# Patient Record
Sex: FEMALE | Race: BLACK OR AFRICAN AMERICAN | NOT HISPANIC OR LATINO | Employment: FULL TIME | URBAN - METROPOLITAN AREA
[De-identification: names, ages, dates, MRNs, and addresses within clinical notes are randomized per-mention and may not be internally consistent; named-entity substitution may affect disease eponyms.]

---

## 2020-05-29 ENCOUNTER — OFFICE VISIT (OUTPATIENT)
Dept: OBSTETRICS AND GYNECOLOGY | Facility: CLINIC | Age: 55
End: 2020-05-29
Payer: COMMERCIAL

## 2020-05-29 VITALS
BODY MASS INDEX: 45.5 KG/M2 | DIASTOLIC BLOOD PRESSURE: 89 MMHG | HEIGHT: 61 IN | HEART RATE: 92 BPM | SYSTOLIC BLOOD PRESSURE: 145 MMHG | WEIGHT: 241 LBS

## 2020-05-29 DIAGNOSIS — Z01.419 WELL WOMAN EXAM: Primary | ICD-10-CM

## 2020-05-29 DIAGNOSIS — Z12.31 BREAST CANCER SCREENING BY MAMMOGRAM: ICD-10-CM

## 2020-05-29 PROCEDURE — 99396 PREV VISIT EST AGE 40-64: CPT | Mod: S$GLB,,, | Performed by: OBSTETRICS & GYNECOLOGY

## 2020-05-29 PROCEDURE — 99396 PR PREVENTIVE VISIT,EST,40-64: ICD-10-PCS | Mod: S$GLB,,, | Performed by: OBSTETRICS & GYNECOLOGY

## 2020-05-29 RX ORDER — INSULIN GLARGINE 300 U/ML
INJECTION, SOLUTION SUBCUTANEOUS
COMMUNITY
Start: 2020-05-22

## 2020-05-29 RX ORDER — FLASH GLUCOSE SENSOR
KIT MISCELLANEOUS
COMMUNITY
Start: 2020-05-17

## 2020-05-29 RX ORDER — INSULIN LISPRO 100 [IU]/ML
INJECTION, SOLUTION INTRAVENOUS; SUBCUTANEOUS
COMMUNITY
Start: 2020-04-03

## 2020-05-29 RX ORDER — VENLAFAXINE HYDROCHLORIDE 150 MG/1
CAPSULE, EXTENDED RELEASE ORAL
COMMUNITY
Start: 2020-05-11

## 2020-05-29 RX ORDER — PIOGLITAZONEHYDROCHLORIDE 30 MG/1
TABLET ORAL
COMMUNITY
Start: 2020-05-11

## 2020-05-29 RX ORDER — DULAGLUTIDE 1.5 MG/.5ML
INJECTION, SOLUTION SUBCUTANEOUS
COMMUNITY
Start: 2020-03-22 | End: 2021-11-23

## 2020-05-29 RX ORDER — ROSUVASTATIN CALCIUM 5 MG/1
TABLET, COATED ORAL
COMMUNITY
Start: 2020-03-14

## 2020-05-29 RX ORDER — LISINOPRIL AND HYDROCHLOROTHIAZIDE 12.5; 2 MG/1; MG/1
TABLET ORAL
COMMUNITY
Start: 2020-05-26

## 2020-05-29 RX ORDER — METFORMIN HYDROCHLORIDE 500 MG/1
TABLET, EXTENDED RELEASE ORAL
COMMUNITY
Start: 2020-04-16

## 2020-05-29 NOTE — PROGRESS NOTES
Subjective:       Patient ID: Sheron Sim is a 54 y.o. female.    Chief Complaint:  Well Woman (LAST PAP 5/6/19 NEGATIVE)      History of Present Illness  She is doing well.  No new health issues,  No abnormal bleeding, No GI or Gu concerns,  No dyspareunia.  No HRT       HPI      GYN & OB History  No LMP recorded. Patient is postmenopausal.     Date of Last Pap: No result found    OB History   No data available       Review of Systems  Review of Systems   Constitutional: Negative for activity change.   Eyes: Negative for visual disturbance.   Respiratory: Negative for shortness of breath.    Cardiovascular: Negative for chest pain.   Gastrointestinal: Negative for abdominal pain.   Genitourinary: Negative for vaginal bleeding.        No abnormal vaginal bleeding   Musculoskeletal: Negative for back pain.   Integumentary:  Negative for rash and breast mass.   Neurological: Negative for numbness.   Psychiatric/Behavioral:        No mood disturbance or changes    Breast: Negative for mass            Objective:    Physical Exam:   Constitutional: She is oriented to person, place, and time. She appears well-developed. She is cooperative.    HENT:   Head: Normocephalic.     Neck: Trachea normal. Neck supple. No thyromegaly present.    Cardiovascular: Regular rhythm and normal heart sounds.     Pulmonary/Chest: Effort normal and breath sounds normal. Right breast exhibits no mass, no nipple discharge and no skin change. Left breast exhibits no mass, no nipple discharge and no skin change.   Bilateral fibrocystic changes  During the exam self breast exams discussed         Abdominal: Soft. Normal appearance. There is no hepatosplenomegaly. There is no tenderness. There is no rebound and no guarding.     Genitourinary: Vagina normal and uterus normal. Pelvic exam was performed with patient supine. There is no lesion on the right labia. There is no lesion on the left labia. Uterus is not enlarged and not tender. Cervix is  normal. Right adnexum displays no mass and no tenderness. Left adnexum displays no mass and no tenderness. No rectocele, cystocele or unspecified prolapse of vaginal walls in the vagina. Labial bartholins normal.Cervix exhibits no discharge. Additional cervical findings: pap smear done  Genitourinary Comments: Due to body habitus  The uterus and ovaries cannot be assessed.   Her rectal was not done as she had a recent clolonoscopy              Lymphadenopathy:        Head (right side): No submental and no submandibular adenopathy present.        Head (left side): No submental and no submandibular adenopathy present.     She has no cervical adenopathy.        Right: No inguinal adenopathy present.        Left: No inguinal adenopathy present.    Neurological: She is alert and oriented to person, place, and time.    Skin: Skin is warm and intact.    Psychiatric: She has a normal mood and affect. Her speech is normal and behavior is normal. Thought content normal.          Assessment:        1. Well woman exam    2. Breast cancer screening by mammogram               Plan:           Pelvic u/s  Pap   Mammogram  Follow up one year or sooner if needed  Self breast exams  Consider health profile if labs not done with PCP  Bone density discussed   Pt is aware we call all results. If she does not hear from our office regarding her result within a week of having a study or procedure performed she is to call the office so that we can research the result for her as I do not know when she is scheduling her procedures.   Chaperone was present

## 2020-06-12 ENCOUNTER — PROCEDURE VISIT (OUTPATIENT)
Dept: OBSTETRICS AND GYNECOLOGY | Facility: CLINIC | Age: 55
End: 2020-06-12
Payer: COMMERCIAL

## 2020-06-12 DIAGNOSIS — Z01.419 WELL WOMAN EXAM: ICD-10-CM

## 2020-06-12 PROCEDURE — 76830 PR  ECHOGRAPHY,TRANSVAGINAL: ICD-10-PCS | Mod: S$GLB,,, | Performed by: OBSTETRICS & GYNECOLOGY

## 2020-06-12 PROCEDURE — 76830 TRANSVAGINAL US NON-OB: CPT | Mod: S$GLB,,, | Performed by: OBSTETRICS & GYNECOLOGY

## 2020-06-16 ENCOUNTER — TELEPHONE (OUTPATIENT)
Dept: OBSTETRICS AND GYNECOLOGY | Facility: CLINIC | Age: 55
End: 2020-06-16

## 2020-06-16 NOTE — TELEPHONE ENCOUNTER
----- Message from Alverto Goel III, MD sent at 6/15/2020  1:40 PM CDT -----  Please call normal u/s

## 2021-05-31 ENCOUNTER — OFFICE VISIT (OUTPATIENT)
Dept: OBSTETRICS AND GYNECOLOGY | Facility: CLINIC | Age: 56
End: 2021-05-31
Payer: COMMERCIAL

## 2021-05-31 VITALS
HEART RATE: 86 BPM | BODY MASS INDEX: 47.8 KG/M2 | WEIGHT: 253 LBS | DIASTOLIC BLOOD PRESSURE: 84 MMHG | SYSTOLIC BLOOD PRESSURE: 135 MMHG

## 2021-05-31 DIAGNOSIS — Z12.31 BREAST CANCER SCREENING BY MAMMOGRAM: ICD-10-CM

## 2021-05-31 DIAGNOSIS — Z01.419 WELL WOMAN EXAM WITH ROUTINE GYNECOLOGICAL EXAM: Primary | ICD-10-CM

## 2021-05-31 PROCEDURE — 99396 PREV VISIT EST AGE 40-64: CPT | Mod: 25,S$GLB,, | Performed by: OBSTETRICS & GYNECOLOGY

## 2021-05-31 PROCEDURE — 99396 PR PREVENTIVE VISIT,EST,40-64: ICD-10-PCS | Mod: 25,S$GLB,, | Performed by: OBSTETRICS & GYNECOLOGY

## 2021-05-31 PROCEDURE — 82274 PR  BLOOD,OCCULT,FECAL HGB,FECES,1-3 SIMULT: ICD-10-PCS | Mod: QW,S$GLB,, | Performed by: OBSTETRICS & GYNECOLOGY

## 2021-05-31 PROCEDURE — 3008F PR BODY MASS INDEX (BMI) DOCUMENTED: ICD-10-PCS | Mod: CPTII,S$GLB,, | Performed by: OBSTETRICS & GYNECOLOGY

## 2021-05-31 PROCEDURE — 82274 ASSAY TEST FOR BLOOD FECAL: CPT | Mod: QW,S$GLB,, | Performed by: OBSTETRICS & GYNECOLOGY

## 2021-05-31 PROCEDURE — 3008F BODY MASS INDEX DOCD: CPT | Mod: CPTII,S$GLB,, | Performed by: OBSTETRICS & GYNECOLOGY

## 2021-11-23 ENCOUNTER — OFFICE VISIT (OUTPATIENT)
Dept: HEMATOLOGY/ONCOLOGY | Facility: CLINIC | Age: 56
End: 2021-11-23
Payer: COMMERCIAL

## 2021-11-23 VITALS
HEIGHT: 62 IN | HEART RATE: 79 BPM | SYSTOLIC BLOOD PRESSURE: 147 MMHG | BODY MASS INDEX: 46.19 KG/M2 | DIASTOLIC BLOOD PRESSURE: 81 MMHG | OXYGEN SATURATION: 98 % | TEMPERATURE: 97 F | WEIGHT: 251 LBS | RESPIRATION RATE: 18 BRPM

## 2021-11-23 DIAGNOSIS — C83.31 DIFFUSE LARGE B-CELL LYMPHOMA OF LYMPH NODES OF NECK: Primary | ICD-10-CM

## 2021-11-23 LAB
ALBUMIN SERPL BCP-MCNC: 3.9 G/DL (ref 3.4–5)
ALBUMIN/GLOBULIN RATIO: 1.03 RATIO (ref 1.1–1.8)
ALP SERPL-CCNC: 114 U/L (ref 46–116)
ALT SERPL W P-5'-P-CCNC: 25 U/L (ref 12–78)
ANION GAP SERPL CALC-SCNC: 8 MMOL/L (ref 3–11)
AST SERPL-CCNC: 17 U/L (ref 15–37)
BASOPHILS NFR BLD: 0.7 % (ref 0–3)
BILIRUB SERPL-MCNC: 0.4 MG/DL (ref 0–1)
BUN SERPL-MCNC: 13 MG/DL (ref 7–18)
BUN/CREAT SERPL: 18.57 RATIO (ref 7–18)
CALCIUM SERPL-MCNC: 9.7 MG/DL (ref 8.8–10.5)
CHLORIDE SERPL-SCNC: 101 MMOL/L (ref 100–108)
CO2 SERPL-SCNC: 32 MMOL/L (ref 21–32)
CREAT SERPL-MCNC: 0.7 MG/DL (ref 0.55–1.02)
EOSINOPHIL NFR BLD: 3.5 % (ref 1–3)
ERYTHROCYTE [DISTWIDTH] IN BLOOD BY AUTOMATED COUNT: 13.6 % (ref 12.5–18)
GFR ESTIMATION: > 60
GLOBULIN: 3.8 G/DL (ref 2.3–3.5)
GLUCOSE SERPL-MCNC: 108 MG/DL (ref 70–110)
HCT VFR BLD AUTO: 39.5 % (ref 37–47)
HGB BLD-MCNC: 12.3 G/DL (ref 12–16)
LDH SERPL L TO P-CCNC: 184 U/L (ref 82–234)
LYMPHOCYTES NFR BLD: 23.2 % (ref 25–40)
MCH RBC QN AUTO: 28.4 PG (ref 27–31.2)
MCHC RBC AUTO-ENTMCNC: 31.1 G/DL (ref 31.8–35.4)
MCV RBC AUTO: 91.2 FL (ref 80–97)
MONOCYTES NFR BLD: 5.9 % (ref 1–15)
NEUTROPHILS # BLD AUTO: 6 10*3/UL (ref 1.8–7.7)
NEUTROPHILS NFR BLD: 66.1 % (ref 37–80)
NUCLEATED RED BLOOD CELLS: 0 %
PLATELETS: 326 10*3/UL (ref 142–424)
POTASSIUM SERPL-SCNC: 4.5 MMOL/L (ref 3.6–5.2)
PROT SERPL-MCNC: 7.7 G/DL (ref 6.4–8.2)
RBC # BLD AUTO: 4.33 10*6/UL (ref 4.2–5.4)
SODIUM BLD-SCNC: 141 MMOL/L (ref 135–145)
URATE SERPL-MCNC: 6 MG/DL (ref 2.6–6)
WBC # BLD: 9.1 10*3/UL (ref 4.6–10.2)

## 2021-11-23 PROCEDURE — 4010F ACE/ARB THERAPY RXD/TAKEN: CPT | Mod: CPTII,S$GLB,, | Performed by: NURSE PRACTITIONER

## 2021-11-23 PROCEDURE — 99205 PR OFFICE/OUTPT VISIT, NEW, LEVL V, 60-74 MIN: ICD-10-PCS | Mod: S$GLB,,, | Performed by: NURSE PRACTITIONER

## 2021-11-23 PROCEDURE — 4010F PR ACE/ARB THEARPY RXD/TAKEN: ICD-10-PCS | Mod: CPTII,S$GLB,, | Performed by: NURSE PRACTITIONER

## 2021-11-23 PROCEDURE — 99205 OFFICE O/P NEW HI 60 MIN: CPT | Mod: S$GLB,,, | Performed by: NURSE PRACTITIONER

## 2021-11-23 RX ORDER — PANTOPRAZOLE SODIUM 40 MG/1
40 TABLET, DELAYED RELEASE ORAL DAILY
COMMUNITY
Start: 2021-11-09

## 2021-11-23 RX ORDER — PEN NEEDLE, DIABETIC 31 GX5/16"
NEEDLE, DISPOSABLE MISCELLANEOUS
COMMUNITY
Start: 2021-11-19

## 2021-11-23 RX ORDER — SEMAGLUTIDE 1.34 MG/ML
INJECTION, SOLUTION SUBCUTANEOUS
COMMUNITY
Start: 2021-11-19 | End: 2023-12-11

## 2021-11-24 ENCOUNTER — TELEPHONE (OUTPATIENT)
Dept: HEMATOLOGY/ONCOLOGY | Facility: CLINIC | Age: 56
End: 2021-11-24
Payer: COMMERCIAL

## 2021-11-24 DIAGNOSIS — C83.31 DIFFUSE LARGE B-CELL LYMPHOMA OF LYMPH NODES OF NECK: Primary | ICD-10-CM

## 2021-11-29 LAB
ANISOCYTOSIS: ABNORMAL
BANDS: 0 % (ref 2–6)
CELLS COUNTED: 100
EOSINOPHIL NFR BLD: 2 % (ref 0–6)
ERYTHROCYTE [DISTWIDTH] IN BLOOD BY AUTOMATED COUNT: 13.6 % (ref 0–15.5)
GRANULOCYTES: 5.6 10*3/UL (ref 1.4–7)
HCT VFR BLD AUTO: 36.2 % (ref 37–47)
HGB BLD-MCNC: 11.4 G/DL (ref 12–16)
LYMPHOCYTES NFR BLD: 19 % (ref 20–45)
MCH RBC QN AUTO: 27.9 PG (ref 27–32)
MCHC RBC AUTO-ENTMCNC: 31.5 % (ref 32–36)
MCV RBC AUTO: 88.7 FL (ref 80–99)
MONOCYTES NFR BLD: 5 % (ref 2–10)
MYELOCYTES: 1 % (ref 0–1)
NEUTROPHILS NFR BLD: 73 % (ref 50–80)
NUCLEATED RBC-MANUAL: 0 /100 WBC
PLATELET MORPHOLOGY: NORMAL
PLATELETS: 283 10*3/UL (ref 130–400)
PMV BLD AUTO: 10 FL (ref 9.2–12.2)
POLYCHROMASIA BLD QL SMEAR: ABNORMAL
RBC # BLD AUTO: 4.08 10*6/UL (ref 4.2–5.4)
SMALL PLATELETS BLD QL SMEAR: NORMAL
WBC # BLD: 7.7 10*3/UL (ref 4.5–10)

## 2021-12-02 ENCOUNTER — TELEPHONE (OUTPATIENT)
Dept: HEMATOLOGY/ONCOLOGY | Facility: CLINIC | Age: 56
End: 2021-12-02
Payer: COMMERCIAL

## 2021-12-08 ENCOUNTER — TELEPHONE (OUTPATIENT)
Dept: HEMATOLOGY/ONCOLOGY | Facility: CLINIC | Age: 56
End: 2021-12-08
Payer: COMMERCIAL

## 2021-12-08 LAB — SPECIMEN TO PATHOLOGY: NORMAL

## 2021-12-14 ENCOUNTER — OFFICE VISIT (OUTPATIENT)
Dept: HEMATOLOGY/ONCOLOGY | Facility: CLINIC | Age: 56
End: 2021-12-14
Payer: COMMERCIAL

## 2021-12-14 VITALS
BODY MASS INDEX: 46.34 KG/M2 | DIASTOLIC BLOOD PRESSURE: 83 MMHG | HEART RATE: 96 BPM | HEIGHT: 62 IN | WEIGHT: 251.81 LBS | RESPIRATION RATE: 18 BRPM | TEMPERATURE: 98 F | OXYGEN SATURATION: 98 % | SYSTOLIC BLOOD PRESSURE: 141 MMHG

## 2021-12-14 DIAGNOSIS — C83.31 DIFFUSE LARGE B-CELL LYMPHOMA OF LYMPH NODES OF NECK: Primary | ICD-10-CM

## 2021-12-14 PROCEDURE — 4010F ACE/ARB THERAPY RXD/TAKEN: CPT | Mod: CPTII,S$GLB,, | Performed by: INTERNAL MEDICINE

## 2021-12-14 PROCEDURE — 99215 OFFICE O/P EST HI 40 MIN: CPT | Mod: S$GLB,,, | Performed by: INTERNAL MEDICINE

## 2021-12-14 PROCEDURE — 4010F PR ACE/ARB THEARPY RXD/TAKEN: ICD-10-PCS | Mod: CPTII,S$GLB,, | Performed by: INTERNAL MEDICINE

## 2021-12-14 PROCEDURE — 99215 PR OFFICE/OUTPT VISIT, EST, LEVL V, 40-54 MIN: ICD-10-PCS | Mod: S$GLB,,, | Performed by: INTERNAL MEDICINE

## 2021-12-17 ENCOUNTER — OFFICE VISIT (OUTPATIENT)
Dept: HEMATOLOGY/ONCOLOGY | Facility: CLINIC | Age: 56
End: 2021-12-17
Payer: COMMERCIAL

## 2021-12-17 VITALS
RESPIRATION RATE: 16 BRPM | SYSTOLIC BLOOD PRESSURE: 128 MMHG | OXYGEN SATURATION: 96 % | HEIGHT: 62 IN | HEART RATE: 96 BPM | WEIGHT: 251.19 LBS | BODY MASS INDEX: 46.22 KG/M2 | TEMPERATURE: 98 F | DIASTOLIC BLOOD PRESSURE: 81 MMHG

## 2021-12-17 DIAGNOSIS — R59.1 LYMPHADENOPATHY: Primary | ICD-10-CM

## 2021-12-17 DIAGNOSIS — I88.9 LYMPHADENITIS: ICD-10-CM

## 2021-12-17 PROBLEM — C83.31 DIFFUSE LARGE B-CELL LYMPHOMA OF LYMPH NODES OF NECK: Status: RESOLVED | Noted: 2021-12-14 | Resolved: 2021-12-17

## 2021-12-17 PROCEDURE — 4010F PR ACE/ARB THEARPY RXD/TAKEN: ICD-10-PCS | Mod: CPTII,S$GLB,, | Performed by: INTERNAL MEDICINE

## 2021-12-17 PROCEDURE — 99213 OFFICE O/P EST LOW 20 MIN: CPT | Mod: S$GLB,,, | Performed by: INTERNAL MEDICINE

## 2021-12-17 PROCEDURE — 4010F ACE/ARB THERAPY RXD/TAKEN: CPT | Mod: CPTII,S$GLB,, | Performed by: INTERNAL MEDICINE

## 2021-12-17 PROCEDURE — 99213 PR OFFICE/OUTPT VISIT, EST, LEVL III, 20-29 MIN: ICD-10-PCS | Mod: S$GLB,,, | Performed by: INTERNAL MEDICINE

## 2022-05-25 ENCOUNTER — PATIENT MESSAGE (OUTPATIENT)
Dept: OBSTETRICS AND GYNECOLOGY | Facility: CLINIC | Age: 57
End: 2022-05-25
Payer: COMMERCIAL

## 2022-06-06 ENCOUNTER — PATIENT MESSAGE (OUTPATIENT)
Dept: OBSTETRICS AND GYNECOLOGY | Facility: CLINIC | Age: 57
End: 2022-06-06
Payer: COMMERCIAL

## 2022-06-15 DIAGNOSIS — R59.1 LYMPHADENOPATHY: Primary | ICD-10-CM

## 2022-06-16 ENCOUNTER — CLINICAL SUPPORT (OUTPATIENT)
Dept: HEMATOLOGY/ONCOLOGY | Facility: CLINIC | Age: 57
End: 2022-06-16
Payer: COMMERCIAL

## 2022-06-16 DIAGNOSIS — R59.1 LYMPHADENOPATHY: ICD-10-CM

## 2022-06-16 LAB
BASOPHILS NFR BLD: 0.7 % (ref 0–3)
EOSINOPHIL NFR BLD: 4 % (ref 1–3)
ERYTHROCYTE [DISTWIDTH] IN BLOOD BY AUTOMATED COUNT: 13.4 % (ref 12.5–18)
HCT VFR BLD AUTO: 35.9 % (ref 37–47)
HGB BLD-MCNC: 11.4 G/DL (ref 12–16)
LYMPHOCYTES NFR BLD: 20.8 % (ref 25–40)
MCH RBC QN AUTO: 28.6 PG (ref 27–31.2)
MCHC RBC AUTO-ENTMCNC: 31.8 G/DL (ref 31.8–35.4)
MCV RBC AUTO: 90.2 FL (ref 80–97)
MONOCYTES NFR BLD: 6.3 % (ref 1–15)
NEUTROPHILS # BLD AUTO: 7.25 10*3/UL (ref 1.8–7.7)
NEUTROPHILS NFR BLD: 67.8 % (ref 37–80)
NUCLEATED RED BLOOD CELLS: 0 %
PLATELETS: 300 10*3/UL (ref 142–424)
RBC # BLD AUTO: 3.98 10*6/UL (ref 4.2–5.4)
WBC # BLD: 10.7 10*3/UL (ref 4.6–10.2)

## 2022-07-15 ENCOUNTER — OFFICE VISIT (OUTPATIENT)
Dept: OBSTETRICS AND GYNECOLOGY | Facility: CLINIC | Age: 57
End: 2022-07-15
Payer: COMMERCIAL

## 2022-07-15 VITALS
SYSTOLIC BLOOD PRESSURE: 146 MMHG | BODY MASS INDEX: 44.02 KG/M2 | WEIGHT: 233 LBS | HEART RATE: 95 BPM | DIASTOLIC BLOOD PRESSURE: 79 MMHG

## 2022-07-15 DIAGNOSIS — Z01.419 WELL WOMAN EXAM WITH ROUTINE GYNECOLOGICAL EXAM: Primary | ICD-10-CM

## 2022-07-15 DIAGNOSIS — Z12.31 BREAST CANCER SCREENING BY MAMMOGRAM: ICD-10-CM

## 2022-07-15 PROCEDURE — 3008F PR BODY MASS INDEX (BMI) DOCUMENTED: ICD-10-PCS | Mod: CPTII,S$GLB,, | Performed by: OBSTETRICS & GYNECOLOGY

## 2022-07-15 PROCEDURE — 99396 PREV VISIT EST AGE 40-64: CPT | Mod: 25,S$GLB,, | Performed by: OBSTETRICS & GYNECOLOGY

## 2022-07-15 PROCEDURE — 3008F BODY MASS INDEX DOCD: CPT | Mod: CPTII,S$GLB,, | Performed by: OBSTETRICS & GYNECOLOGY

## 2022-07-15 PROCEDURE — 1159F MED LIST DOCD IN RCRD: CPT | Mod: CPTII,S$GLB,, | Performed by: OBSTETRICS & GYNECOLOGY

## 2022-07-15 PROCEDURE — 1159F PR MEDICATION LIST DOCUMENTED IN MEDICAL RECORD: ICD-10-PCS | Mod: CPTII,S$GLB,, | Performed by: OBSTETRICS & GYNECOLOGY

## 2022-07-15 PROCEDURE — 3078F PR MOST RECENT DIASTOLIC BLOOD PRESSURE < 80 MM HG: ICD-10-PCS | Mod: CPTII,S$GLB,, | Performed by: OBSTETRICS & GYNECOLOGY

## 2022-07-15 PROCEDURE — 82274 PR  BLOOD,OCCULT,FECAL HGB,FECES,1-3 SIMULT: ICD-10-PCS | Mod: QW,S$GLB,, | Performed by: OBSTETRICS & GYNECOLOGY

## 2022-07-15 PROCEDURE — 4010F ACE/ARB THERAPY RXD/TAKEN: CPT | Mod: CPTII,S$GLB,, | Performed by: OBSTETRICS & GYNECOLOGY

## 2022-07-15 PROCEDURE — 3078F DIAST BP <80 MM HG: CPT | Mod: CPTII,S$GLB,, | Performed by: OBSTETRICS & GYNECOLOGY

## 2022-07-15 PROCEDURE — 99396 PR PREVENTIVE VISIT,EST,40-64: ICD-10-PCS | Mod: 25,S$GLB,, | Performed by: OBSTETRICS & GYNECOLOGY

## 2022-07-15 PROCEDURE — 3077F PR MOST RECENT SYSTOLIC BLOOD PRESSURE >= 140 MM HG: ICD-10-PCS | Mod: CPTII,S$GLB,, | Performed by: OBSTETRICS & GYNECOLOGY

## 2022-07-15 PROCEDURE — 82274 ASSAY TEST FOR BLOOD FECAL: CPT | Mod: QW,S$GLB,, | Performed by: OBSTETRICS & GYNECOLOGY

## 2022-07-15 PROCEDURE — 4010F PR ACE/ARB THEARPY RXD/TAKEN: ICD-10-PCS | Mod: CPTII,S$GLB,, | Performed by: OBSTETRICS & GYNECOLOGY

## 2022-07-15 PROCEDURE — 3077F SYST BP >= 140 MM HG: CPT | Mod: CPTII,S$GLB,, | Performed by: OBSTETRICS & GYNECOLOGY

## 2022-07-15 NOTE — PROGRESS NOTES
Subjective:       Patient ID: Sheron Sim is a 56 y.o. female.    Chief Complaint:  Well Woman (PT STATES SHE EXPERIENCED A LITTLE SPOTTING A COUPLE WEEKS. SHE STATES IT WAS ONLY WHEN SHE WIPED, NOT ENOUGH TO NEED A PAD. )      History of Present Illness  She is doing well.  No new health issues,  No abnormal bleeding, No GI or Gu concerns,  No dyspareunia.   She is doing some follow up for a lymph node but al has been negative.      She did have some vag spotting recently.  Not sexually active.  No HRT       HPI      GYN & OB History  No LMP recorded. Patient is postmenopausal.     Date of Last Pap: No result found    OB History    Para Term  AB Living   0 0 0 0 0 0   SAB IAB Ectopic Multiple Live Births   0 0 0 0 0       Review of Systems  Review of Systems   Constitutional: Negative for activity change.   Eyes: Negative for visual disturbance.   Respiratory: Negative for shortness of breath.    Cardiovascular: Negative for chest pain.   Gastrointestinal: Negative for abdominal pain.   Genitourinary: Positive for vaginal bleeding.        No abnormal vaginal bleeding   Musculoskeletal: Negative for back pain.   Integumentary:  Negative for rash and breast mass.   Neurological: Negative for numbness.   Psychiatric/Behavioral:        No mood disturbance or changes    Breast: Negative for mass            Objective:    Physical Exam:   Constitutional: She is oriented to person, place, and time. She appears well-developed. She is cooperative.    HENT:   Head: Normocephalic.     Neck: Trachea normal. No thyromegaly present.    Cardiovascular: Normal rate, regular rhythm and normal heart sounds.     Pulmonary/Chest: Effort normal and breath sounds normal. Right breast exhibits no mass, no nipple discharge and no skin change. Left breast exhibits no mass, no nipple discharge and no skin change.   Bilateral fibrocystic changes  During the exam self breast exams discussed         Abdominal: Soft. There is no  abdominal tenderness. There is no rebound and no guarding.     Genitourinary:    Vagina, uterus and rectum normal.   Rectum:      Guaiac result negative.   Guaiac negative stool.    Pelvic exam was performed with patient supine.   Labial bartholins normal.There is no lesion on the right labia. There is no lesion on the left labia. Cervix is normal. Right adnexum displays no mass and no tenderness. Left adnexum displays no mass and no tenderness. Cervix exhibits no discharge. Also,  pap smear completed  Uterus is not enlarged and not tender.              Lymphadenopathy:        Head (right side): No submental and no submandibular adenopathy present.        Head (left side): No submental and no submandibular adenopathy present.     She has no cervical adenopathy.    Neurological: She is alert and oriented to person, place, and time.    Skin: Skin is warm.    Psychiatric: She has a normal mood and affect. Her speech is normal and behavior is normal. Thought content normal.          Assessment:        1. Well woman exam with routine gynecological exam    2. Breast cancer screening by mammogram               Plan:             Pap   Mammogram  Follow up one year or sooner if needed  Self breast exams  Consider health profile if labs not done with PCP  Recommend colonoscopy as indicated  Bone density discussed   Pt is aware we call all results. If she does not hear from our office regarding her result within a week of having a study or procedure performed she is to call the office so that we can research the result for her as I do not know when she is scheduling her procedures.   Chaperone was present

## 2022-12-14 DIAGNOSIS — C83.31 DIFFUSE LARGE B-CELL LYMPHOMA OF LYMPH NODES OF NECK: Primary | ICD-10-CM

## 2022-12-15 ENCOUNTER — OFFICE VISIT (OUTPATIENT)
Dept: HEMATOLOGY/ONCOLOGY | Facility: CLINIC | Age: 57
End: 2022-12-15
Payer: COMMERCIAL

## 2022-12-15 VITALS
OXYGEN SATURATION: 97 % | BODY MASS INDEX: 42.86 KG/M2 | WEIGHT: 227 LBS | RESPIRATION RATE: 18 BRPM | DIASTOLIC BLOOD PRESSURE: 81 MMHG | HEART RATE: 78 BPM | HEIGHT: 61 IN | SYSTOLIC BLOOD PRESSURE: 132 MMHG

## 2022-12-15 DIAGNOSIS — R59.1 LYMPHADENOPATHY: Primary | ICD-10-CM

## 2022-12-15 LAB
BASOPHILS NFR BLD: 0.7 % (ref 0–3)
EOSINOPHIL NFR BLD: 3.7 % (ref 1–3)
ERYTHROCYTE [DISTWIDTH] IN BLOOD BY AUTOMATED COUNT: 13.3 % (ref 12.5–18)
HCT VFR BLD AUTO: 36.3 % (ref 37–47)
HGB BLD-MCNC: 11.8 G/DL (ref 12–16)
LYMPHOCYTES NFR BLD: 21.2 % (ref 25–40)
MCH RBC QN AUTO: 29.5 PG (ref 27–31.2)
MCHC RBC AUTO-ENTMCNC: 32.5 G/DL (ref 31.8–35.4)
MCV RBC AUTO: 90.8 FL (ref 80–97)
MONOCYTES NFR BLD: 6.2 % (ref 1–15)
NEUTROPHILS # BLD AUTO: 6.9 10*3/UL (ref 1.8–7.7)
NEUTROPHILS NFR BLD: 67.8 % (ref 37–80)
NUCLEATED RED BLOOD CELLS: 0 %
PLATELETS: 309 10*3/UL (ref 142–424)
RBC # BLD AUTO: 4 10*6/UL (ref 4.2–5.4)
WBC # BLD: 10.2 10*3/UL (ref 4.6–10.2)

## 2022-12-15 PROCEDURE — 1159F PR MEDICATION LIST DOCUMENTED IN MEDICAL RECORD: ICD-10-PCS | Mod: CPTII,S$GLB,, | Performed by: NURSE PRACTITIONER

## 2022-12-15 PROCEDURE — 3075F SYST BP GE 130 - 139MM HG: CPT | Mod: CPTII,S$GLB,, | Performed by: NURSE PRACTITIONER

## 2022-12-15 PROCEDURE — 1159F MED LIST DOCD IN RCRD: CPT | Mod: CPTII,S$GLB,, | Performed by: NURSE PRACTITIONER

## 2022-12-15 PROCEDURE — 3008F BODY MASS INDEX DOCD: CPT | Mod: CPTII,S$GLB,, | Performed by: NURSE PRACTITIONER

## 2022-12-15 PROCEDURE — 4010F ACE/ARB THERAPY RXD/TAKEN: CPT | Mod: CPTII,S$GLB,, | Performed by: NURSE PRACTITIONER

## 2022-12-15 PROCEDURE — 4010F PR ACE/ARB THEARPY RXD/TAKEN: ICD-10-PCS | Mod: CPTII,S$GLB,, | Performed by: NURSE PRACTITIONER

## 2022-12-15 PROCEDURE — 3079F PR MOST RECENT DIASTOLIC BLOOD PRESSURE 80-89 MM HG: ICD-10-PCS | Mod: CPTII,S$GLB,, | Performed by: NURSE PRACTITIONER

## 2022-12-15 PROCEDURE — 99214 PR OFFICE/OUTPT VISIT, EST, LEVL IV, 30-39 MIN: ICD-10-PCS | Mod: S$GLB,,, | Performed by: NURSE PRACTITIONER

## 2022-12-15 PROCEDURE — 99214 OFFICE O/P EST MOD 30 MIN: CPT | Mod: S$GLB,,, | Performed by: NURSE PRACTITIONER

## 2022-12-15 PROCEDURE — 1160F RVW MEDS BY RX/DR IN RCRD: CPT | Mod: CPTII,S$GLB,, | Performed by: NURSE PRACTITIONER

## 2022-12-15 PROCEDURE — 3075F PR MOST RECENT SYSTOLIC BLOOD PRESS GE 130-139MM HG: ICD-10-PCS | Mod: CPTII,S$GLB,, | Performed by: NURSE PRACTITIONER

## 2022-12-15 PROCEDURE — 1160F PR REVIEW ALL MEDS BY PRESCRIBER/CLIN PHARMACIST DOCUMENTED: ICD-10-PCS | Mod: CPTII,S$GLB,, | Performed by: NURSE PRACTITIONER

## 2022-12-15 PROCEDURE — 3008F PR BODY MASS INDEX (BMI) DOCUMENTED: ICD-10-PCS | Mod: CPTII,S$GLB,, | Performed by: NURSE PRACTITIONER

## 2022-12-15 PROCEDURE — 3079F DIAST BP 80-89 MM HG: CPT | Mod: CPTII,S$GLB,, | Performed by: NURSE PRACTITIONER

## 2022-12-15 NOTE — PROGRESS NOTES
MEDICAL ONCOLOGY FOLLOW UP CONSULTATION NOTE    Patient ID: Sheron Sim is a 57 y.o. female.    Chief Complaint: Suspicion for Diffuse Large B cell lymphoma    HPI: is 56-year-old black female referred to our clinic by Dr. Núñez, ENT. Patient states approximately 3 months ago she noticed a mass underneath her chin.  She reported this to her PCP Dr. Gracia who referred her for evaluation with ENT.  Subsequently patient underwent CT neck,  Unavailable for review at this time, and FNA.  Patient states FNA was inconclusive so Dr. Núñez proceeded with excisional biopsy of lymph node on 11/12/21.  Pathology is listed below which is suspicious for B cell lymphoma.  Patient denies any fevers night sweats or weight loss and no palpable lymph nodes noted today on physical exam.     Pathology: 11/16/21    Lymph node, neck, excisional biopsy:  Reactive lymph node with follicular and interfollicular hyperplasia and focal findings suggestive of progressive transformation of germinal center    Immunostain results:  BCL2:  Negative in germinal center region of follicles.  BCL 6:  Positive in B cells in germinal center origin of follicles.  CD10:  Positive in germinal center region of follicles    CD3:  Highlights background largest interfollicular T cell infiltrate.  Ki 67:  Proliferative index is 80% and germinal center regions, compatible with reactive.    BONE MARROW, SITE NOT SPECIFIED, CORE BIOPSY, CLOT SECTION, ASPIRATE, AND   PERIPHERAL SMEAR: 12/7/21  -- NEGATIVE FOR INVOLVEMENT BY LYMPHOMA.   -- NORMOCELLULAR BONE MARROW (APPROXIMATELY 50% CELLULARITY) WITH:   -- NO ATYPICAL INFILTRATE OF LYMPHOID CELLS, AS EVALUATED BY   IMMUNOHISTOCHEMISTRY.   -- GRANULOCYTIC SERIES PRESENT AND MATURING WITHOUT ATYPIA.   -- ERYTHROID SERIES PRESENT AND MATURING WITHOUT ATYPIA.   -- M:E RATIO APPROXIMATELY 2:1.   -- MEGAKARYOCYTES NORMAL IN NUMBER, SCATTERED, WITHOUT ATYPIA.   -- NO GRANULOMATOUS INFLAMMATION OR METASTATIC  CARCINOMA SEEN.   -- IRON STAINING PRESENT.   -- FLOW CYTOMETRY REVEALS NO UNIQUE CELL POPULATIONS, SEE REPORT BELOW.   - FISH STUDIES ARE NORMAL, SEE REPORT BELOW.   - SEE COMMENT.   Comment:   Clinically, the patient has a high suspicion for underlying a B-cell   lymphoma process involving the neck.  The bone marrow evaluation is an   essentially normal evaluation without evidence of lymphoma.  CCND1,   CCND1/IGH, BCL2, and BCL6 studies ( a requested by ordering clinician) are   negative for rearrangement by FISH analyses. Correlate clinically for   significance.     CYTOGENETICS REPORT:   KARYOTYPE:                   -46,XX[20]                   -NORMAL FEMALE KARYOTYPE   FLUORESCENCE IN-SITU HYBRIDIZATION (FISH) STUDIES:                  -FISH ANALYSIS FOR CCND1 (BCL1) Rearrangement - NEGATIVE      -nuc mimi([id number])[200]                  -FISH ANALYSIS FOR CCND1/IGH REARRANGEMENT - NEGATIVE                  -nuc mimi(CCND1,IGH)x2[200]      -FISH Analysis for BCL2 Rearrangement - NEGATIVE      -nuc mimi(BCL2x2)[200]                   -FISH Analysis for BCL6 Rearrangement - NEGATIVE      -nuc mimi(BCL6x2)[200]   INTERPRETATION: Cytogenetic analysis of the bone marrow aspirate reveals a   NORMAL female karyotype. At the level of resolution obtained in this study,   all of the chromosomes had normal G-banded patterns with no evidence of any   consistent chromosomal aberration to suggest an acquired clonal abnormality.    CCND1, CCND1/IGH, BCL2, and BCL6 are negative for rearrangement by FISH    Imaging:     PET scan: 12/13/21    The previously noted submental, sublingual mass is markedly decreased in size now measuring no more than 14 mm in short axis.  This previously measured up to 2.6 cm.  An adjacent 5 mm lesion appears similar in size compared to prior study.  The uptake in this region is at or below blood pool.  No enlarged cervical chain lymph nodes are identified with no abnormal uptake noted in the  neck.    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     GERD (gastroesophageal reflux disease)     Hypercholesterolemia     Hypertension     Lymphadenopathy     Thickened endometrium      Family History   Problem Relation Age of Onset    Lung cancer Maternal Grandfather     Diabetes Father     Diabetes Mother     Diabetes Brother     Lung cancer Sister     Diabetes Sister     No Known Problems Maternal Grandmother     No Known Problems Paternal Grandmother     No Known Problems Paternal Grandfather      Social History     Socioeconomic History    Marital status: Single   Tobacco Use    Smoking status: Never    Smokeless tobacco: Never   Substance and Sexual Activity    Alcohol use: Yes    Drug use: Not Currently    Sexual activity: Not Currently     Past Surgical History:   Procedure Laterality Date    BONE BIOPSY  12/2021    LYMPH NODE REMOVAL      lymph nodes removal      tendonitis           Review of systems:  Review of Systems    Review of Systems   Constitutional: Negative for activity change, appetite change, chills, diaphoresis, fatigue and unexpected weight change.   HENT: Negative for congestion, facial swelling, hearing loss, mouth sores, trouble swallowing and voice change.  Soft tissue swelling on the face. See HPI  Eyes: Negative for photophobia, pain, discharge and itching.   Respiratory: Negative for apnea, cough, choking, chest tightness and shortness of breath.    Cardiovascular: Negative for chest pain, palpitations and leg swelling.   Gastrointestinal: Negative for abdominal distention, abdominal pain, anal bleeding and blood in stool.   Endocrine: Negative for cold intolerance, heat intolerance, polydipsia and polyphagia.    Genitourinary: Negative for difficulty urinating, dysuria, flank pain and hematuria.   Musculoskeletal: Negative for arthralgias, back pain, joint swelling, myalgias, neck pain and neck stiffness.   Skin: Negative for color change, pallor and wound.   Allergic/Immunologic: Negative for environmental allergies, food allergies and immunocompromised state.   Neurological: Negative for dizziness, seizures, facial asymmetry, speech difficulty, light-headedness, numbness and headaches.   Hematological: Negative for adenopathy. Does not bruise/bleed easily.   Psychiatric/Behavioral: Negative for agitation, behavioral problems, confusion, decreased concentration and sleep disturbance.       Physical Exam Vitals and nursing note reviewed.   Constitutional:       General: She is not in acute distress.     Appearance: Normal appearance. She is not ill-appearing.   HENT:      Head: Normocephalic and atraumatic.      Nose: No congestion or rhinorrhea.  Very mild soft tissue swelling status post excisional biopsy site.  No lymphadenopathy  Eyes:      General: No scleral icterus.     Extraocular Movements: Extraocular movements intact.      Pupils: Pupils are equal, round, and reactive to light.   Cardiovascular:      Rate and Rhythm: Normal rate and regular rhythm.      Pulses: Normal pulses.      Heart sounds: Normal heart sounds. No murmur heard.  No gallop.    Pulmonary:      Effort: Pulmonary effort is normal. No respiratory distress.      Breath sounds: Normal breath sounds. No stridor. No wheezing or rhonchi.   Abdominal:      General: Bowel sounds are normal. There is no distension.      Palpations: There is no mass.      Tenderness: There is no abdominal tenderness. There is no guarding.   Musculoskeletal:         General: No swelling, tenderness, deformity or signs of injury. Normal range of motion.      Cervical back: Normal range of motion and neck supple. No rigidity. No muscular tenderness.      Right lower leg: No  edema.      Left lower leg: No edema.   Skin:     General: Skin is warm.      Coloration: Skin is not jaundiced or pale.      Findings: No bruising or lesion.   Neurological:      General: No focal deficit present.      Mental Status: She is alert and oriented to person, place, and time.      Cranial Nerves: No cranial nerve deficit.      Sensory: No sensory deficit.      Motor: No weakness.      Gait: Gait normal.   Psychiatric:         Mood and Affect: Mood normal.         Behavior: Behavior normal.         Thought Content: Thought content normal.     Vitals:    12/15/22 0903   BP: 132/81   Pulse: 78   Resp: 18   Body surface area is 2.11 meters squared.    Assessment/Plan:      ECOG 1    1. Suspicion for Diffuse Large B cell Lymphoma:    == No B symptoms.  Normal LDH.  CBC with differential revealed normochromic normocytic anemia with normal white blood cell count and platelets.  == Bone marrow aspiration biopsy revealed no evidence of B-cell lymphoma.  Normal cytogenetics.  == Excision all lymph node biopsy from the neck revealed lymph node hyperplasia:  Follicular and interfollicular areas with focal findings suggestive of progressive transformation of germinal center.  == Will discussed in tumor board as pathologist feels there is a high suspicion for B-cell lymphoma.  == PET-CT scan done 12/13/2021 shows the submental/sublingual mass to have markedly decreased now from initial 2.6 cm to 14 mm, also the uptake in this region is at or below blood pool which all points towards a reactive process.  The spleen is also normal in size but does demonstrate an FDG uptake with an SUV max of 7.8.  This is again indeterminate and not conclusive of lymphomatous involvement.  == I do not feel this is convincing evidence to diagnose this patient as a diffuse large B-cell lymphoma, rather it could be a reactive proliferation of lymph nodes which have diminished in size since initial presentation.  I [facility] do not feel that an  induction chemotherapy is indicated.  However will discuss in tumor board for consensus.    === 12/17/21:  Tumor board discussion in the interim and the consensus reached as above that this is likely benign proliferation of lymph nodes secondary to viral infection.  PET scan was also reviewed as above and did not show enlargement.  The FDG uptake in the spleen is likely physiologically reactive and could be secondary to a viral infection which previously caused enlargement of the lymph node as noted on the pathology review.  Since no diagnosis of lymphoma would hence continue with observation only.  Will repeat imaging in case patient develops swelling or enlargement or any symptoms.  -- 12/15/22: doing well, no c/o , labs reviewed and will continue observation      RTC in 6 months for MD visit with labs: CBC  cmp ldh prior       Total time spent in counseling and discussion about further management options including relevant lab work, treatment,  prognosis, medications and intended side effects was more than 25 minutes. More than 50% of the time was spent on counseling and coordination of care.  I spent a total of 25 minutes on the day of the visit.This includes face to face time and non-face to face time preparing to see the patient (eg, review of tests), Obtaining and/or reviewing separately obtained history, Documenting clinical information in the electronic or other health record, Independently interpreting resultsand communicating results to the patient/family/caregiver, or Care coordination.

## 2023-06-13 ENCOUNTER — TELEPHONE (OUTPATIENT)
Dept: HEMATOLOGY/ONCOLOGY | Facility: CLINIC | Age: 58
End: 2023-06-13
Payer: COMMERCIAL

## 2023-06-13 DIAGNOSIS — C83.31 DIFFUSE LARGE B-CELL LYMPHOMA OF LYMPH NODES OF NECK: Primary | ICD-10-CM

## 2023-06-15 ENCOUNTER — OFFICE VISIT (OUTPATIENT)
Dept: HEMATOLOGY/ONCOLOGY | Facility: CLINIC | Age: 58
End: 2023-06-15
Payer: COMMERCIAL

## 2023-06-15 VITALS
DIASTOLIC BLOOD PRESSURE: 75 MMHG | HEIGHT: 61 IN | BODY MASS INDEX: 42.67 KG/M2 | SYSTOLIC BLOOD PRESSURE: 127 MMHG | WEIGHT: 226 LBS | OXYGEN SATURATION: 95 % | RESPIRATION RATE: 16 BRPM | HEART RATE: 89 BPM

## 2023-06-15 DIAGNOSIS — R59.1 LYMPHADENOPATHY: Primary | ICD-10-CM

## 2023-06-15 PROCEDURE — 1159F PR MEDICATION LIST DOCUMENTED IN MEDICAL RECORD: ICD-10-PCS | Mod: CPTII,S$GLB,, | Performed by: NURSE PRACTITIONER

## 2023-06-15 PROCEDURE — 99214 OFFICE O/P EST MOD 30 MIN: CPT | Mod: S$GLB,,, | Performed by: NURSE PRACTITIONER

## 2023-06-15 PROCEDURE — 3008F BODY MASS INDEX DOCD: CPT | Mod: CPTII,S$GLB,, | Performed by: NURSE PRACTITIONER

## 2023-06-15 PROCEDURE — 3008F PR BODY MASS INDEX (BMI) DOCUMENTED: ICD-10-PCS | Mod: CPTII,S$GLB,, | Performed by: NURSE PRACTITIONER

## 2023-06-15 PROCEDURE — 3074F PR MOST RECENT SYSTOLIC BLOOD PRESSURE < 130 MM HG: ICD-10-PCS | Mod: CPTII,S$GLB,, | Performed by: NURSE PRACTITIONER

## 2023-06-15 PROCEDURE — 1160F RVW MEDS BY RX/DR IN RCRD: CPT | Mod: CPTII,S$GLB,, | Performed by: NURSE PRACTITIONER

## 2023-06-15 PROCEDURE — 1160F PR REVIEW ALL MEDS BY PRESCRIBER/CLIN PHARMACIST DOCUMENTED: ICD-10-PCS | Mod: CPTII,S$GLB,, | Performed by: NURSE PRACTITIONER

## 2023-06-15 PROCEDURE — 1159F MED LIST DOCD IN RCRD: CPT | Mod: CPTII,S$GLB,, | Performed by: NURSE PRACTITIONER

## 2023-06-15 PROCEDURE — 99214 PR OFFICE/OUTPT VISIT, EST, LEVL IV, 30-39 MIN: ICD-10-PCS | Mod: S$GLB,,, | Performed by: NURSE PRACTITIONER

## 2023-06-15 PROCEDURE — 3078F PR MOST RECENT DIASTOLIC BLOOD PRESSURE < 80 MM HG: ICD-10-PCS | Mod: CPTII,S$GLB,, | Performed by: NURSE PRACTITIONER

## 2023-06-15 PROCEDURE — 3074F SYST BP LT 130 MM HG: CPT | Mod: CPTII,S$GLB,, | Performed by: NURSE PRACTITIONER

## 2023-06-15 PROCEDURE — 3078F DIAST BP <80 MM HG: CPT | Mod: CPTII,S$GLB,, | Performed by: NURSE PRACTITIONER

## 2023-06-15 PROCEDURE — 4010F PR ACE/ARB THEARPY RXD/TAKEN: ICD-10-PCS | Mod: CPTII,S$GLB,, | Performed by: NURSE PRACTITIONER

## 2023-06-15 PROCEDURE — 4010F ACE/ARB THERAPY RXD/TAKEN: CPT | Mod: CPTII,S$GLB,, | Performed by: NURSE PRACTITIONER

## 2023-06-15 NOTE — PROGRESS NOTES
MEDICAL ONCOLOGY FOLLOW UP CONSULTATION NOTE    Patient ID: Sheron Sim is a 57 y.o. female.    Chief Complaint: Suspicion for Diffuse Large B cell lymphoma    HPI: is 56-year-old black female referred to our clinic by Dr. Núñez, ENT. Patient states approximately 3 months ago she noticed a mass underneath her chin.  She reported this to her PCP Dr. Gracia who referred her for evaluation with ENT.  Subsequently patient underwent CT neck,  Unavailable for review at this time, and FNA.  Patient states FNA was inconclusive so Dr. Núñez proceeded with excisional biopsy of lymph node on 11/12/21.  Pathology is listed below which is suspicious for B cell lymphoma.  Patient denies any fevers night sweats or weight loss and no palpable lymph nodes noted today on physical exam.     Pathology: 11/16/21    Lymph node, neck, excisional biopsy:  Reactive lymph node with follicular and interfollicular hyperplasia and focal findings suggestive of progressive transformation of germinal center    Immunostain results:  BCL2:  Negative in germinal center region of follicles.  BCL 6:  Positive in B cells in germinal center origin of follicles.  CD10:  Positive in germinal center region of follicles    CD3:  Highlights background largest interfollicular T cell infiltrate.  Ki 67:  Proliferative index is 80% and germinal center regions, compatible with reactive.    BONE MARROW, SITE NOT SPECIFIED, CORE BIOPSY, CLOT SECTION, ASPIRATE, AND   PERIPHERAL SMEAR: 12/7/21  -- NEGATIVE FOR INVOLVEMENT BY LYMPHOMA.   -- NORMOCELLULAR BONE MARROW (APPROXIMATELY 50% CELLULARITY) WITH:   -- NO ATYPICAL INFILTRATE OF LYMPHOID CELLS, AS EVALUATED BY   IMMUNOHISTOCHEMISTRY.   -- GRANULOCYTIC SERIES PRESENT AND MATURING WITHOUT ATYPIA.   -- ERYTHROID SERIES PRESENT AND MATURING WITHOUT ATYPIA.   -- M:E RATIO APPROXIMATELY 2:1.   -- MEGAKARYOCYTES NORMAL IN NUMBER, SCATTERED, WITHOUT ATYPIA.   -- NO GRANULOMATOUS INFLAMMATION OR METASTATIC  CARCINOMA SEEN.   -- IRON STAINING PRESENT.   -- FLOW CYTOMETRY REVEALS NO UNIQUE CELL POPULATIONS, SEE REPORT BELOW.   - FISH STUDIES ARE NORMAL, SEE REPORT BELOW.   - SEE COMMENT.   Comment:   Clinically, the patient has a high suspicion for underlying a B-cell   lymphoma process involving the neck.  The bone marrow evaluation is an   essentially normal evaluation without evidence of lymphoma.  CCND1,   CCND1/IGH, BCL2, and BCL6 studies ( a requested by ordering clinician) are   negative for rearrangement by FISH analyses. Correlate clinically for   significance.     CYTOGENETICS REPORT:   KARYOTYPE:                   -46,XX[20]                   -NORMAL FEMALE KARYOTYPE   FLUORESCENCE IN-SITU HYBRIDIZATION (FISH) STUDIES:                  -FISH ANALYSIS FOR CCND1 (BCL1) Rearrangement - NEGATIVE      -nuc mimi(CVTA9a7)[200]                  -FISH ANALYSIS FOR CCND1/IGH REARRANGEMENT - NEGATIVE                  -nuc mimi(CCND1,IGH)x2[200]      -FISH Analysis for BCL2 Rearrangement - NEGATIVE      -nuc mimi(BCL2x2)[200]                   -FISH Analysis for BCL6 Rearrangement - NEGATIVE      -nuc mimi(BCL6x2)[200]   INTERPRETATION: Cytogenetic analysis of the bone marrow aspirate reveals a   NORMAL female karyotype. At the level of resolution obtained in this study,   all of the chromosomes had normal G-banded patterns with no evidence of any   consistent chromosomal aberration to suggest an acquired clonal abnormality.    CCND1, CCND1/IGH, BCL2, and BCL6 are negative for rearrangement by FISH    Imaging:     PET scan: 12/13/21    The previously noted submental, sublingual mass is markedly decreased in size now measuring no more than 14 mm in short axis.  This previously measured up to 2.6 cm.  An adjacent 5 mm lesion appears similar in size compared to prior study.  The uptake in this region is at or below blood pool.  No enlarged cervical chain lymph nodes are identified with no abnormal uptake noted in the  neck.    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     GERD (gastroesophageal reflux disease)     Hypercholesterolemia     Hypertension     Lymphadenopathy     Thickened endometrium      Family History   Problem Relation Age of Onset    Lung cancer Maternal Grandfather     Diabetes Father     Diabetes Mother     Diabetes Brother     Lung cancer Sister     Diabetes Sister     No Known Problems Maternal Grandmother     No Known Problems Paternal Grandmother     No Known Problems Paternal Grandfather      Social History     Socioeconomic History    Marital status: Single   Tobacco Use    Smoking status: Never    Smokeless tobacco: Never   Substance and Sexual Activity    Alcohol use: Yes    Drug use: Not Currently    Sexual activity: Not Currently     Past Surgical History:   Procedure Laterality Date    BONE BIOPSY  12/2021    LYMPH NODE REMOVAL      lymph nodes removal      tendonitis           Review of systems:  Review of Systems   Constitutional:  Negative for activity change, appetite change, chills, diaphoresis, fatigue, fever and unexpected weight change.   HENT:  Negative for congestion, dental problem, mouth sores, nosebleeds, sore throat, tinnitus and voice change.    Eyes:  Negative for photophobia, discharge and visual disturbance.   Respiratory:  Negative for apnea, cough, choking, chest tightness, shortness of breath, wheezing and stridor.    Cardiovascular:  Negative for chest pain, palpitations and leg swelling.   Gastrointestinal:  Negative for abdominal distention, abdominal pain, anal bleeding, blood in stool, constipation, diarrhea, nausea, rectal pain and vomiting.   Endocrine: Negative for cold intolerance and heat intolerance.    Genitourinary:  Negative for difficulty urinating, dysuria, hematuria, menstrual problem, vaginal bleeding, vaginal discharge and vaginal pain.   Musculoskeletal:  Negative for arthralgias, back pain, gait problem, joint swelling and myalgias.   Skin:  Negative for color change, pallor, rash and wound.   Neurological:  Negative for dizziness, syncope, light-headedness and numbness.   Hematological:  Negative for adenopathy. Does not bruise/bleed easily.   Psychiatric/Behavioral:  Negative for agitation, confusion, sleep disturbance and suicidal ideas. The patient is not nervous/anxious.      Review of Systems   Constitutional: Negative for activity change, appetite change, chills, diaphoresis, fatigue and unexpected weight change.   HENT: Negative for congestion, facial swelling, hearing loss, mouth sores, trouble swallowing and voice change.  Soft tissue swelling on the face. See HPI  Eyes: Negative for photophobia, pain, discharge and itching.   Respiratory: Negative for apnea, cough, choking, chest tightness and shortness of breath.    Cardiovascular: Negative for chest pain, palpitations and leg swelling.   Gastrointestinal: Negative for abdominal distention, abdominal pain, anal bleeding and blood in stool.   Endocrine: Negative for cold intolerance, heat intolerance, polydipsia and polyphagia.   Genitourinary: Negative for difficulty urinating, dysuria, flank pain and hematuria.   Musculoskeletal: Negative for arthralgias, back pain, joint swelling, myalgias, neck pain and neck stiffness.   Skin: Negative for color change, pallor and wound.   Allergic/Immunologic: Negative for environmental allergies, food allergies and immunocompromised state.   Neurological: Negative for dizziness, seizures, facial asymmetry, speech difficulty, light-headedness, numbness and headaches.   Hematological: Negative for adenopathy. Does not bruise/bleed easily.   Psychiatric/Behavioral: Negative for agitation, behavioral  problems, confusion, decreased concentration and sleep disturbance.       Physical Exam  Constitutional:       Appearance: She is well-developed.   HENT:      Head: Normocephalic.   Eyes:      Conjunctiva/sclera: Conjunctivae normal.      Pupils: Pupils are equal, round, and reactive to light.   Cardiovascular:      Rate and Rhythm: Normal rate and regular rhythm.      Heart sounds: Normal heart sounds.   Pulmonary:      Effort: Pulmonary effort is normal.      Breath sounds: Normal breath sounds.   Chest:      Chest wall: No mass, deformity or tenderness.   Breasts:     Breasts are symmetrical.   Abdominal:      General: Bowel sounds are normal.      Palpations: Abdomen is soft.   Musculoskeletal:         General: Normal range of motion.      Cervical back: Normal range of motion and neck supple.   Skin:     General: Skin is warm and dry.   Neurological:      Mental Status: She is alert and oriented to person, place, and time.   Psychiatric:         Behavior: Behavior normal.         Thought Content: Thought content normal.         Judgment: Judgment normal.    Vitals and nursing note reviewed.   Constitutional:       General: She is not in acute distress.     Appearance: Normal appearance. She is not ill-appearing.   HENT:      Head: Normocephalic and atraumatic.      Nose: No congestion or rhinorrhea.  Very mild soft tissue swelling status post excisional biopsy site.  No lymphadenopathy  Eyes:      General: No scleral icterus.     Extraocular Movements: Extraocular movements intact.      Pupils: Pupils are equal, round, and reactive to light.   Cardiovascular:      Rate and Rhythm: Normal rate and regular rhythm.      Pulses: Normal pulses.      Heart sounds: Normal heart sounds. No murmur heard.  No gallop.    Pulmonary:      Effort: Pulmonary effort is normal. No respiratory distress.      Breath sounds: Normal breath sounds. No stridor. No wheezing or rhonchi.   Abdominal:      General: Bowel sounds are  normal. There is no distension.      Palpations: There is no mass.      Tenderness: There is no abdominal tenderness. There is no guarding.   Musculoskeletal:         General: No swelling, tenderness, deformity or signs of injury. Normal range of motion.      Cervical back: Normal range of motion and neck supple. No rigidity. No muscular tenderness.      Right lower leg: No edema.      Left lower leg: No edema.   Skin:     General: Skin is warm.      Coloration: Skin is not jaundiced or pale.      Findings: No bruising or lesion.   Neurological:      General: No focal deficit present.      Mental Status: She is alert and oriented to person, place, and time.      Cranial Nerves: No cranial nerve deficit.      Sensory: No sensory deficit.      Motor: No weakness.      Gait: Gait normal.   Psychiatric:         Mood and Affect: Mood normal.         Behavior: Behavior normal.         Thought Content: Thought content normal.     Vitals:    06/15/23 0916   BP: 127/75   Pulse: 89   Resp: 16   Body surface area is 2.1 meters squared.    Assessment/Plan:      ECOG 1    1. Suspicion for Diffuse Large B cell Lymphoma:    == No B symptoms.  Normal LDH.  CBC with differential revealed normochromic normocytic anemia with normal white blood cell count and platelets.  == Bone marrow aspiration biopsy revealed no evidence of B-cell lymphoma.  Normal cytogenetics.  == Excision all lymph node biopsy from the neck revealed lymph node hyperplasia:  Follicular and interfollicular areas with focal findings suggestive of progressive transformation of germinal center.  == Will discussed in tumor board as pathologist feels there is a high suspicion for B-cell lymphoma.  == PET-CT scan done 12/13/2021 shows the submental/sublingual mass to have markedly decreased now from initial 2.6 cm to 14 mm, also the uptake in this region is at or below blood pool which all points towards a reactive process.  The spleen is also normal in size but does  demonstrate an FDG uptake with an SUV max of 7.8.  This is again indeterminate and not conclusive of lymphomatous involvement.  == I do not feel this is convincing evidence to diagnose this patient as a diffuse large B-cell lymphoma, rather it could be a reactive proliferation of lymph nodes which have diminished in size since initial presentation.  I [facility] do not feel that an induction chemotherapy is indicated.  However will discuss in tumor board for consensus.    === 12/17/21:  Tumor board discussion in the interim and the consensus reached as above that this is likely benign proliferation of lymph nodes secondary to viral infection.  PET scan was also reviewed as above and did not show enlargement.  The FDG uptake in the spleen is likely physiologically reactive and could be secondary to a viral infection which previously caused enlargement of the lymph node as noted on the pathology review.  Since no diagnosis of lymphoma would hence continue with observation only.  Will repeat imaging in case patient develops swelling or enlargement or any symptoms.  -- 12/15/22: doing well, no c/o , labs reviewed and will continue observation  -- no acute changes, labs reviewed, PE negative, denies any fever night sweats or weight loss.     RTC in 6 months for MD visit with labs: CBC  cmp ldh prior       Total time spent in counseling and discussion about further management options including relevant lab work, treatment,  prognosis, medications and intended side effects was more than 25 minutes. More than 50% of the time was spent on counseling and coordination of care.  I spent a total of 25 minutes on the day of the visit.This includes face to face time and non-face to face time preparing to see the patient (eg, review of tests), Obtaining and/or reviewing separately obtained history, Documenting clinical information in the electronic or other health record, Independently interpreting resultsand communicating results to the  patient/family/caregiver, or Care coordination.

## 2023-12-08 LAB
ALBUMIN SERPL BCP-MCNC: 3.4 G/DL (ref 3.4–5)
ALBUMIN/GLOBULIN RATIO: 0.83 RATIO (ref 1.1–1.8)
ALP SERPL-CCNC: 88 U/L (ref 46–116)
ALT SERPL W P-5'-P-CCNC: 22 U/L (ref 12–78)
ANION GAP SERPL CALC-SCNC: 7 MMOL/L (ref 3–11)
AST SERPL-CCNC: 14 U/L (ref 15–37)
BASOPHILS NFR BLD: 0.7 % (ref 0–3)
BILIRUB SERPL-MCNC: 0.4 MG/DL (ref 0–1)
BUN SERPL-MCNC: 16 MG/DL (ref 7–18)
BUN/CREAT SERPL: 23.88 RATIO (ref 7–18)
CALCIUM SERPL-MCNC: 9 MG/DL (ref 8.8–10.5)
CHLORIDE SERPL-SCNC: 102 MMOL/L (ref 100–108)
CO2 SERPL-SCNC: 31 MMOL/L (ref 21–32)
CREAT SERPL-MCNC: 0.67 MG/DL (ref 0.55–1.02)
EOSINOPHIL NFR BLD: 2.4 % (ref 1–3)
ERYTHROCYTE [DISTWIDTH] IN BLOOD BY AUTOMATED COUNT: 13.1 % (ref 12.5–18)
GFR ESTIMATION: > 60
GLOBULIN: 4.1 G/DL (ref 2.3–3.5)
GLUCOSE SERPL-MCNC: 97 MG/DL (ref 70–110)
HCT VFR BLD AUTO: 35 % (ref 37–47)
HGB BLD-MCNC: 11.2 G/DL (ref 12–16)
LDH SERPL L TO P-CCNC: 180 U/L (ref 82–234)
LYMPHOCYTES NFR BLD: 21.6 % (ref 25–40)
MCH RBC QN AUTO: 28.9 PG (ref 27–31.2)
MCHC RBC AUTO-ENTMCNC: 32 G/DL (ref 31.8–35.4)
MCV RBC AUTO: 90.2 FL (ref 80–97)
MONOCYTES NFR BLD: 7.1 % (ref 1–15)
NEUTROPHILS # BLD AUTO: 5.94 10*3/UL (ref 1.8–7.7)
NEUTROPHILS NFR BLD: 68 % (ref 37–80)
NUCLEATED RED BLOOD CELLS: 0 %
PLATELETS: 306 10*3/UL (ref 142–424)
POTASSIUM SERPL-SCNC: 4.4 MMOL/L (ref 3.6–5.2)
PROT SERPL-MCNC: 7.5 G/DL (ref 6.4–8.2)
RBC # BLD AUTO: 3.88 10*6/UL (ref 4.2–5.4)
SODIUM BLD-SCNC: 140 MMOL/L (ref 135–145)
WBC # BLD: 8.7 10*3/UL (ref 4.6–10.2)

## 2023-12-11 ENCOUNTER — OFFICE VISIT (OUTPATIENT)
Dept: HEMATOLOGY/ONCOLOGY | Facility: CLINIC | Age: 58
End: 2023-12-11
Payer: COMMERCIAL

## 2023-12-11 VITALS
RESPIRATION RATE: 18 BRPM | OXYGEN SATURATION: 98 % | HEART RATE: 93 BPM | BODY MASS INDEX: 44.67 KG/M2 | SYSTOLIC BLOOD PRESSURE: 149 MMHG | DIASTOLIC BLOOD PRESSURE: 84 MMHG | HEIGHT: 61 IN | WEIGHT: 236.63 LBS

## 2023-12-11 DIAGNOSIS — I88.9 LYMPHADENITIS: ICD-10-CM

## 2023-12-11 DIAGNOSIS — D64.9 ANEMIA, UNSPECIFIED TYPE: ICD-10-CM

## 2023-12-11 LAB
FOLATE: 6.4 NG/ML (ref 3.1–17.5)
IRON: 57 UG/DL (ref 26–170)
TOTAL IRON BINDING CAPACITY: 287 UG/DL (ref 250–450)
VITAMIN B12: 1276 PG/ML (ref 193–986)

## 2023-12-11 PROCEDURE — 4010F PR ACE/ARB THEARPY RXD/TAKEN: ICD-10-PCS | Mod: CPTII,S$GLB,, | Performed by: NURSE PRACTITIONER

## 2023-12-11 PROCEDURE — 1159F PR MEDICATION LIST DOCUMENTED IN MEDICAL RECORD: ICD-10-PCS | Mod: CPTII,S$GLB,, | Performed by: NURSE PRACTITIONER

## 2023-12-11 PROCEDURE — 3008F PR BODY MASS INDEX (BMI) DOCUMENTED: ICD-10-PCS | Mod: CPTII,S$GLB,, | Performed by: NURSE PRACTITIONER

## 2023-12-11 PROCEDURE — 99214 OFFICE O/P EST MOD 30 MIN: CPT | Mod: S$GLB,,, | Performed by: NURSE PRACTITIONER

## 2023-12-11 PROCEDURE — 3077F SYST BP >= 140 MM HG: CPT | Mod: CPTII,S$GLB,, | Performed by: NURSE PRACTITIONER

## 2023-12-11 PROCEDURE — 99214 PR OFFICE/OUTPT VISIT, EST, LEVL IV, 30-39 MIN: ICD-10-PCS | Mod: S$GLB,,, | Performed by: NURSE PRACTITIONER

## 2023-12-11 PROCEDURE — 3079F DIAST BP 80-89 MM HG: CPT | Mod: CPTII,S$GLB,, | Performed by: NURSE PRACTITIONER

## 2023-12-11 PROCEDURE — 3077F PR MOST RECENT SYSTOLIC BLOOD PRESSURE >= 140 MM HG: ICD-10-PCS | Mod: CPTII,S$GLB,, | Performed by: NURSE PRACTITIONER

## 2023-12-11 PROCEDURE — 1159F MED LIST DOCD IN RCRD: CPT | Mod: CPTII,S$GLB,, | Performed by: NURSE PRACTITIONER

## 2023-12-11 PROCEDURE — 4010F ACE/ARB THERAPY RXD/TAKEN: CPT | Mod: CPTII,S$GLB,, | Performed by: NURSE PRACTITIONER

## 2023-12-11 PROCEDURE — 3079F PR MOST RECENT DIASTOLIC BLOOD PRESSURE 80-89 MM HG: ICD-10-PCS | Mod: CPTII,S$GLB,, | Performed by: NURSE PRACTITIONER

## 2023-12-11 PROCEDURE — 3008F BODY MASS INDEX DOCD: CPT | Mod: CPTII,S$GLB,, | Performed by: NURSE PRACTITIONER

## 2023-12-11 RX ORDER — TIRZEPATIDE 5 MG/.5ML
INJECTION, SOLUTION SUBCUTANEOUS
COMMUNITY
Start: 2023-07-31

## 2023-12-11 RX ORDER — MELOXICAM 15 MG/1
15 TABLET ORAL
COMMUNITY
Start: 2023-11-28

## 2023-12-11 NOTE — PROGRESS NOTES
MEDICAL ONCOLOGY FOLLOW UP CONSULTATION NOTE    Patient ID: Sheron RODRIGUEZ January is a 58 y.o. female.    Chief Complaint: Suspicion for Diffuse Large B cell lymphoma    HPI: is 56-year-old black female referred to our clinic by Dr. Núñez, ENT. Patient states approximately 3 months ago she noticed a mass underneath her chin.  She reported this to her PCP Dr. Gracia who referred her for evaluation with ENT.  Subsequently patient underwent CT neck,  Unavailable for review at this time, and FNA.  Patient states FNA was inconclusive so Dr. Núñez proceeded with excisional biopsy of lymph node on 11/12/21.  Pathology is listed below which is suspicious for B cell lymphoma.  Patient denies any fevers night sweats or weight loss and no palpable lymph nodes noted today on physical exam.     Pathology: 11/16/21    Lymph node, neck, excisional biopsy:  Reactive lymph node with follicular and interfollicular hyperplasia and focal findings suggestive of progressive transformation of germinal center    Immunostain results:  BCL2:  Negative in germinal center region of follicles.  BCL 6:  Positive in B cells in germinal center origin of follicles.  CD10:  Positive in germinal center region of follicles    CD3:  Highlights background largest interfollicular T cell infiltrate.  Ki 67:  Proliferative index is 80% and germinal center regions, compatible with reactive.    BONE MARROW, SITE NOT SPECIFIED, CORE BIOPSY, CLOT SECTION, ASPIRATE, AND   PERIPHERAL SMEAR: 12/7/21  -- NEGATIVE FOR INVOLVEMENT BY LYMPHOMA.   -- NORMOCELLULAR BONE MARROW (APPROXIMATELY 50% CELLULARITY) WITH:   -- NO ATYPICAL INFILTRATE OF LYMPHOID CELLS, AS EVALUATED BY   IMMUNOHISTOCHEMISTRY.   -- GRANULOCYTIC SERIES PRESENT AND MATURING WITHOUT ATYPIA.   -- ERYTHROID SERIES PRESENT AND MATURING WITHOUT ATYPIA.   -- M:E RATIO APPROXIMATELY 2:1.   -- MEGAKARYOCYTES NORMAL IN NUMBER, SCATTERED, WITHOUT ATYPIA.   -- NO GRANULOMATOUS INFLAMMATION OR METASTATIC  CARCINOMA SEEN.   -- IRON STAINING PRESENT.   -- FLOW CYTOMETRY REVEALS NO UNIQUE CELL POPULATIONS, SEE REPORT BELOW.   - FISH STUDIES ARE NORMAL, SEE REPORT BELOW.   - SEE COMMENT.   Comment:   Clinically, the patient has a high suspicion for underlying a B-cell   lymphoma process involving the neck.  The bone marrow evaluation is an   essentially normal evaluation without evidence of lymphoma.  CCND1,   CCND1/IGH, BCL2, and BCL6 studies ( a requested by ordering clinician) are   negative for rearrangement by FISH analyses. Correlate clinically for   significance.     CYTOGENETICS REPORT:   KARYOTYPE:                   -46,XX[20]                   -NORMAL FEMALE KARYOTYPE   FLUORESCENCE IN-SITU HYBRIDIZATION (FISH) STUDIES:                  -FISH ANALYSIS FOR CCND1 (BCL1) Rearrangement - NEGATIVE      -nuc mimi([id number])[200]                  -FISH ANALYSIS FOR CCND1/IGH REARRANGEMENT - NEGATIVE                  -nuc mimi(CCND1,IGH)x2[200]      -FISH Analysis for BCL2 Rearrangement - NEGATIVE      -nuc mimi(BCL2x2)[200]                   -FISH Analysis for BCL6 Rearrangement - NEGATIVE      -nuc mimi(BCL6x2)[200]   INTERPRETATION: Cytogenetic analysis of the bone marrow aspirate reveals a   NORMAL female karyotype. At the level of resolution obtained in this study,   all of the chromosomes had normal G-banded patterns with no evidence of any   consistent chromosomal aberration to suggest an acquired clonal abnormality.    CCND1, CCND1/IGH, BCL2, and BCL6 are negative for rearrangement by FISH    Imaging:     PET scan: 12/13/21    The previously noted submental, sublingual mass is markedly decreased in size now measuring no more than 14 mm in short axis.  This previously measured up to 2.6 cm.  An adjacent 5 mm lesion appears similar in size compared to prior study.  The uptake in this region is at or below blood pool.  No enlarged cervical chain lymph nodes are identified with no abnormal uptake noted in the  neck.    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     GERD (gastroesophageal reflux disease)     Hypercholesterolemia     Hypertension     Lymphadenopathy     Thickened endometrium      Family History   Problem Relation Age of Onset    Lung cancer Maternal Grandfather     Diabetes Father     Diabetes Mother     Diabetes Brother     Lung cancer Sister     Diabetes Sister     No Known Problems Maternal Grandmother     No Known Problems Paternal Grandmother     No Known Problems Paternal Grandfather      Social History     Socioeconomic History    Marital status: Single   Tobacco Use    Smoking status: Never    Smokeless tobacco: Never   Substance and Sexual Activity    Alcohol use: Yes    Drug use: Not Currently    Sexual activity: Not Currently     Social Determinants of Health     Financial Resource Strain: Low Risk  (12/6/2023)    Overall Financial Resource Strain (CARDIA)     Difficulty of Paying Living Expenses: Not hard at all   Food Insecurity: No Food Insecurity (12/6/2023)    Hunger Vital Sign     Worried About Running Out of Food in the Last Year: Never true     Ran Out of Food in the Last Year: Never true   Transportation Needs: No Transportation Needs (12/6/2023)    PRAPARE - Transportation     Lack of Transportation (Medical): No     Lack of Transportation (Non-Medical): No   Physical Activity: Unknown (5/29/2020)    Exercise Vital Sign     Days of Exercise per Week: Patient refused     Minutes of Exercise per Session: Patient refused   Stress: No Stress Concern Present (12/6/2023)    [employer] - Occupational Stress Questionnaire     Feeling of Stress : Not at all   Social Connections: Unknown (12/6/2023)     Social Connection and Isolation Panel [NHANES]     Frequency of Communication with Friends and Family: More than three times a week     Frequency of Social Gatherings with Friends and Family: More than three times a week     Active Member of Clubs or Organizations: Yes     Attends Club or Organization Meetings: More than 4 times per year     Marital Status: Never    Housing Stability: Low Risk  (12/6/2023)    Housing Stability Vital Sign     Unable to Pay for Housing in the Last Year: No     Number of Places Lived in the Last Year: 1     Unstable Housing in the Last Year: No     Past Surgical History:   Procedure Laterality Date    BONE BIOPSY  12/2021    LYMPH NODE REMOVAL      lymph nodes removal      tendonitis           Review of systems:  Review of Systems   Constitutional:  Negative for activity change, appetite change, chills, diaphoresis, fatigue, fever and unexpected weight change.   HENT:  Negative for congestion, dental problem, mouth sores, nosebleeds, sore throat, tinnitus and voice change.    Eyes:  Negative for photophobia, discharge and visual disturbance.   Respiratory:  Negative for apnea, cough, choking, chest tightness, shortness of breath, wheezing and stridor.    Cardiovascular:  Negative for chest pain, palpitations and leg swelling.   Gastrointestinal:  Negative for abdominal distention, abdominal pain, anal bleeding, blood in stool, constipation, diarrhea, nausea, rectal pain and vomiting.   Endocrine: Negative for cold intolerance and heat intolerance.   Genitourinary:  Negative for difficulty urinating, dysuria, hematuria, menstrual problem, vaginal bleeding, vaginal discharge and vaginal pain.   Musculoskeletal:  Negative for arthralgias, back pain, gait problem, joint swelling and myalgias.   Skin:  Negative for color change, pallor, rash and wound.   Neurological:  Negative for dizziness, syncope, light-headedness and numbness.   Hematological:  Negative for adenopathy. Does  not bruise/bleed easily.   Psychiatric/Behavioral:  Negative for agitation, confusion, sleep disturbance and suicidal ideas. The patient is not nervous/anxious.        Review of Systems   Constitutional: Negative for activity change, appetite change, chills, diaphoresis, fatigue and unexpected weight change.   HENT: Negative for congestion, facial swelling, hearing loss, mouth sores, trouble swallowing and voice change.  Soft tissue swelling on the face. See HPI  Eyes: Negative for photophobia, pain, discharge and itching.   Respiratory: Negative for apnea, cough, choking, chest tightness and shortness of breath.    Cardiovascular: Negative for chest pain, palpitations and leg swelling.   Gastrointestinal: Negative for abdominal distention, abdominal pain, anal bleeding and blood in stool.   Endocrine: Negative for cold intolerance, heat intolerance, polydipsia and polyphagia.   Genitourinary: Negative for difficulty urinating, dysuria, flank pain and hematuria.   Musculoskeletal: Negative for arthralgias, back pain, joint swelling, myalgias, neck pain and neck stiffness.   Skin: Negative for color change, pallor and wound.   Allergic/Immunologic: Negative for environmental allergies, food allergies and immunocompromised state.   Neurological: Negative for dizziness, seizures, facial asymmetry, speech difficulty, light-headedness, numbness and headaches.   Hematological: Negative for adenopathy. Does not bruise/bleed easily.   Psychiatric/Behavioral: Negative for agitation, behavioral problems, confusion, decreased concentration and sleep disturbance.       Physical Exam  Constitutional:       Appearance: She is well-developed.   HENT:      Head: Normocephalic.   Eyes:      Conjunctiva/sclera: Conjunctivae normal.      Pupils: Pupils are equal, round, and reactive to light.   Cardiovascular:      Rate and Rhythm: Normal rate and regular rhythm.      Heart sounds: Normal heart sounds.   Pulmonary:      Effort:  Pulmonary effort is normal.      Breath sounds: Normal breath sounds.   Chest:      Chest wall: No mass, deformity or tenderness.   Breasts:     Breasts are symmetrical.   Abdominal:      General: Bowel sounds are normal.      Palpations: Abdomen is soft.   Musculoskeletal:         General: Normal range of motion.      Cervical back: Normal range of motion and neck supple.   Skin:     General: Skin is warm and dry.   Neurological:      Mental Status: She is alert and oriented to person, place, and time.   Psychiatric:         Behavior: Behavior normal.         Thought Content: Thought content normal.         Judgment: Judgment normal.      Vitals and nursing note reviewed.   Constitutional:       General: She is not in acute distress.     Appearance: Normal appearance. She is not ill-appearing.   HENT:      Head: Normocephalic and atraumatic.      Nose: No congestion or rhinorrhea.  Very mild soft tissue swelling status post excisional biopsy site.  No lymphadenopathy  Eyes:      General: No scleral icterus.     Extraocular Movements: Extraocular movements intact.      Pupils: Pupils are equal, round, and reactive to light.   Cardiovascular:      Rate and Rhythm: Normal rate and regular rhythm.      Pulses: Normal pulses.      Heart sounds: Normal heart sounds. No murmur heard.  No gallop.    Pulmonary:      Effort: Pulmonary effort is normal. No respiratory distress.      Breath sounds: Normal breath sounds. No stridor. No wheezing or rhonchi.   Abdominal:      General: Bowel sounds are normal. There is no distension.      Palpations: There is no mass.      Tenderness: There is no abdominal tenderness. There is no guarding.   Musculoskeletal:         General: No swelling, tenderness, deformity or signs of injury. Normal range of motion.      Cervical back: Normal range of motion and neck supple. No rigidity. No muscular tenderness.      Right lower leg: No edema.      Left lower leg: No edema.   Skin:     General:  Skin is warm.      Coloration: Skin is not jaundiced or pale.      Findings: No bruising or lesion.   Neurological:      General: No focal deficit present.      Mental Status: She is alert and oriented to person, place, and time.      Cranial Nerves: No cranial nerve deficit.      Sensory: No sensory deficit.      Motor: No weakness.      Gait: Gait normal.   Psychiatric:         Mood and Affect: Mood normal.         Behavior: Behavior normal.         Thought Content: Thought content normal.     Vitals:    12/11/23 1024   BP: (!) 149/84   Pulse: 93   Resp: 18   Body surface area is 2.15 meters squared.    Assessment/Plan:      ECOG 1    1. Suspicion for Diffuse Large B cell Lymphoma:    == No B symptoms.  Normal LDH.  CBC with differential revealed normochromic normocytic anemia with normal white blood cell count and platelets.  == Bone marrow aspiration biopsy revealed no evidence of B-cell lymphoma.  Normal cytogenetics.  == Excision all lymph node biopsy from the neck revealed lymph node hyperplasia:  Follicular and interfollicular areas with focal findings suggestive of progressive transformation of germinal center.  == Will discussed in tumor board as pathologist feels there is a high suspicion for B-cell lymphoma.  == PET-CT scan done 12/13/2021 shows the submental/sublingual mass to have markedly decreased now from initial 2.6 cm to 14 mm, also the uptake in this region is at or below blood pool which all points towards a reactive process.  The spleen is also normal in size but does demonstrate an FDG uptake with an SUV max of 7.8.  This is again indeterminate and not conclusive of lymphomatous involvement.  == I do not feel this is convincing evidence to diagnose this patient as a diffuse large B-cell lymphoma, rather it could be a reactive proliferation of lymph nodes which have diminished in size since initial presentation.  I [facility] do not feel that an induction chemotherapy is indicated.  However will  discuss in tumor board for consensus.    === 12/17/21:  Tumor board discussion in the interim and the consensus reached as above that this is likely benign proliferation of lymph nodes secondary to viral infection.  PET scan was also reviewed as above and did not show enlargement.  The FDG uptake in the spleen is likely physiologically reactive and could be secondary to a viral infection which previously caused enlargement of the lymph node as noted on the pathology review.  Since no diagnosis of lymphoma would hence continue with observation only.  Will repeat imaging in case patient develops swelling or enlargement or any symptoms.  -- 12/15/22: doing well, no c/o , labs reviewed and will continue observation  -- no acute changes, labs reviewed, PE negative, denies any fever night sweats or weight loss.     2. Left TKR scheduled for 12/27/23 with Dr Almazan     3. Anemia, unknown cause  Denies any bleeding, family history of Sickle cell or beta thalessemia, has had hx of BALDOMERO   Will check iron studies, b12/folate/TSH as she is scheduled for upcoming surgery.  Addendum:  Labs failed to identify any abnormalities. Called patient and discussed.       Total time spent in counseling and discussion about further management options including relevant lab work, treatment,  prognosis, medications and intended side effects was more than 25 minutes. More than 50% of the time was spent on counseling and coordination of care.  I spent a total of 25 minutes on the day of the visit.This includes face to face time and non-face to face time preparing to see the patient (eg, review of tests), Obtaining and/or reviewing separately obtained history, Documenting clinical information in the electronic or other health record, Independently interpreting resultsand communicating results to the patient/family/caregiver, or Care coordination.

## 2023-12-14 LAB
% SATURATION: 20 % (ref 20–50)
IRON: 57 UG/DL (ref 26–170)
TOTAL IRON BINDING CAPACITY: 287 UG/DL (ref 250–450)
TSH SERPL DL<=0.005 MIU/L-ACNC: 0.74 UIU/ML (ref 0.36–3.74)

## 2023-12-15 LAB
FOLATE: 6.6 NG/ML (ref 3.1–17.5)
VITAMIN B12: 1197 PG/ML (ref 193–986)

## 2024-06-10 DIAGNOSIS — I88.9 LYMPHADENITIS: Primary | ICD-10-CM

## 2024-06-11 ENCOUNTER — OFFICE VISIT (OUTPATIENT)
Dept: HEMATOLOGY/ONCOLOGY | Facility: CLINIC | Age: 59
End: 2024-06-11
Payer: COMMERCIAL

## 2024-06-11 VITALS
DIASTOLIC BLOOD PRESSURE: 81 MMHG | BODY MASS INDEX: 42.69 KG/M2 | WEIGHT: 232 LBS | HEIGHT: 62 IN | HEART RATE: 80 BPM | OXYGEN SATURATION: 95 % | SYSTOLIC BLOOD PRESSURE: 128 MMHG | RESPIRATION RATE: 20 BRPM

## 2024-06-11 DIAGNOSIS — D64.9 ANEMIA, UNSPECIFIED TYPE: Primary | ICD-10-CM

## 2024-06-11 LAB
ALBUMIN SERPL BCP-MCNC: 3.6 G/DL (ref 3.4–5)
ALBUMIN/GLOBULIN RATIO: 1 RATIO (ref 1.1–1.8)
ALP SERPL-CCNC: 102 U/L (ref 46–116)
ALT SERPL W P-5'-P-CCNC: 24 U/L (ref 12–78)
ANION GAP SERPL CALC-SCNC: 6 MMOL/L (ref 3–11)
AST SERPL-CCNC: 16 U/L (ref 15–37)
BASOPHILS NFR BLD: 0.6 % (ref 0–3)
BILIRUB SERPL-MCNC: 0.6 MG/DL (ref 0–1)
BUN SERPL-MCNC: 17 MG/DL (ref 7–18)
BUN/CREAT SERPL: 21.79 RATIO (ref 7–18)
CALCIUM SERPL-MCNC: 9.2 MG/DL (ref 8.8–10.5)
CHLORIDE SERPL-SCNC: 103 MMOL/L (ref 100–108)
CO2 SERPL-SCNC: 32 MMOL/L (ref 21–32)
CREAT SERPL-MCNC: 0.78 MG/DL (ref 0.55–1.02)
EOSINOPHIL NFR BLD: 2.1 % (ref 1–3)
ERYTHROCYTE [DISTWIDTH] IN BLOOD BY AUTOMATED COUNT: 13.4 % (ref 12.5–18)
GFR ESTIMATION: > 60
GLOBULIN: 3.6 G/DL (ref 2.3–3.5)
GLUCOSE SERPL-MCNC: 112 MG/DL (ref 70–110)
HCT VFR BLD AUTO: 36.4 % (ref 37–47)
HGB BLD-MCNC: 11.6 G/DL (ref 12–16)
LYMPHOCYTES NFR BLD: 17.2 % (ref 25–40)
MCH RBC QN AUTO: 28.6 PG (ref 27–31.2)
MCHC RBC AUTO-ENTMCNC: 31.9 G/DL (ref 31.8–35.4)
MCV RBC AUTO: 89.9 FL (ref 80–97)
MONOCYTES NFR BLD: 8 % (ref 1–15)
NEUTROPHILS # BLD AUTO: 6.28 10*3/UL (ref 1.8–7.7)
NEUTROPHILS NFR BLD: 71.8 % (ref 37–80)
NUCLEATED RED BLOOD CELLS: 0 %
PLATELETS: 287 10*3/UL (ref 142–424)
POTASSIUM SERPL-SCNC: 4.7 MMOL/L (ref 3.6–5.2)
PROT SERPL-MCNC: 7.2 G/DL (ref 6.4–8.2)
RBC # BLD AUTO: 4.05 10*6/UL (ref 4.2–5.4)
SODIUM BLD-SCNC: 141 MMOL/L (ref 135–145)
WBC # BLD: 8.7 10*3/UL (ref 4.6–10.2)

## 2024-06-11 PROCEDURE — 3074F SYST BP LT 130 MM HG: CPT | Mod: CPTII,S$GLB,, | Performed by: NURSE PRACTITIONER

## 2024-06-11 PROCEDURE — 1159F MED LIST DOCD IN RCRD: CPT | Mod: CPTII,S$GLB,, | Performed by: NURSE PRACTITIONER

## 2024-06-11 PROCEDURE — 4010F ACE/ARB THERAPY RXD/TAKEN: CPT | Mod: CPTII,S$GLB,, | Performed by: NURSE PRACTITIONER

## 2024-06-11 PROCEDURE — 3079F DIAST BP 80-89 MM HG: CPT | Mod: CPTII,S$GLB,, | Performed by: NURSE PRACTITIONER

## 2024-06-11 PROCEDURE — 99214 OFFICE O/P EST MOD 30 MIN: CPT | Mod: S$GLB,,, | Performed by: NURSE PRACTITIONER

## 2024-06-11 PROCEDURE — 3008F BODY MASS INDEX DOCD: CPT | Mod: CPTII,S$GLB,, | Performed by: NURSE PRACTITIONER

## 2024-06-11 NOTE — PROGRESS NOTES
MEDICAL ONCOLOGY FOLLOW UP CONSULTATION NOTE    Patient ID: Sheron RODRIGUEZ January is a 58 y.o. female.    Chief Complaint: Suspicion for Diffuse Large B cell lymphoma    HPI: is 56-year-old black female referred to our clinic by Dr. Núñez, ENT. Patient states approximately 3 months ago she noticed a mass underneath her chin.  She reported this to her PCP Dr. Gracia who referred her for evaluation with ENT.  Subsequently patient underwent CT neck,  Unavailable for review at this time, and FNA.  Patient states FNA was inconclusive so Dr. Núñez proceeded with excisional biopsy of lymph node on 11/12/21.  Pathology is listed below which is suspicious for B cell lymphoma.  Patient denies any fevers night sweats or weight loss and no palpable lymph nodes noted today on physical exam.     Pathology: 11/16/21    Lymph node, neck, excisional biopsy:  Reactive lymph node with follicular and interfollicular hyperplasia and focal findings suggestive of progressive transformation of germinal center    Immunostain results:  BCL2:  Negative in germinal center region of follicles.  BCL 6:  Positive in B cells in germinal center origin of follicles.  CD10:  Positive in germinal center region of follicles    CD3:  Highlights background largest interfollicular T cell infiltrate.  Ki 67:  Proliferative index is 80% and germinal center regions, compatible with reactive.    BONE MARROW, SITE NOT SPECIFIED, CORE BIOPSY, CLOT SECTION, ASPIRATE, AND   PERIPHERAL SMEAR: 12/7/21  -- NEGATIVE FOR INVOLVEMENT BY LYMPHOMA.   -- NORMOCELLULAR BONE MARROW (APPROXIMATELY 50% CELLULARITY) WITH:   -- NO ATYPICAL INFILTRATE OF LYMPHOID CELLS, AS EVALUATED BY   IMMUNOHISTOCHEMISTRY.   -- GRANULOCYTIC SERIES PRESENT AND MATURING WITHOUT ATYPIA.   -- ERYTHROID SERIES PRESENT AND MATURING WITHOUT ATYPIA.   -- M:E RATIO APPROXIMATELY 2:1.   -- MEGAKARYOCYTES NORMAL IN NUMBER, SCATTERED, WITHOUT ATYPIA.   -- NO GRANULOMATOUS INFLAMMATION OR METASTATIC  CARCINOMA SEEN.   -- IRON STAINING PRESENT.   -- FLOW CYTOMETRY REVEALS NO UNIQUE CELL POPULATIONS, SEE REPORT BELOW.   - FISH STUDIES ARE NORMAL, SEE REPORT BELOW.   - SEE COMMENT.   Comment:   Clinically, the patient has a high suspicion for underlying a B-cell   lymphoma process involving the neck.  The bone marrow evaluation is an   essentially normal evaluation without evidence of lymphoma.  CCND1,   CCND1/IGH, BCL2, and BCL6 studies ( a requested by ordering clinician) are   negative for rearrangement by FISH analyses. Correlate clinically for   significance.     CYTOGENETICS REPORT:   KARYOTYPE:                   -46,XX[20]                   -NORMAL FEMALE KARYOTYPE   FLUORESCENCE IN-SITU HYBRIDIZATION (FISH) STUDIES:                  -FISH ANALYSIS FOR CCND1 (BCL1) Rearrangement - NEGATIVE      -nuc mimi(TLDD2h4)[200]                  -FISH ANALYSIS FOR CCND1/IGH REARRANGEMENT - NEGATIVE                  -nuc mimi(CCND1,IGH)x2[200]      -FISH Analysis for BCL2 Rearrangement - NEGATIVE      -nuc mimi(BCL2x2)[200]                   -FISH Analysis for BCL6 Rearrangement - NEGATIVE      -nuc mimi(BCL6x2)[200]   INTERPRETATION: Cytogenetic analysis of the bone marrow aspirate reveals a   NORMAL female karyotype. At the level of resolution obtained in this study,   all of the chromosomes had normal G-banded patterns with no evidence of any   consistent chromosomal aberration to suggest an acquired clonal abnormality.    CCND1, CCND1/IGH, BCL2, and BCL6 are negative for rearrangement by FISH    Imaging:     PET scan: 12/13/21    The previously noted submental, sublingual mass is markedly decreased in size now measuring no more than 14 mm in short axis.  This previously measured up to 2.6 cm.  An adjacent 5 mm lesion appears similar in size compared to prior study.  The uptake in this region is at or below blood pool.  No enlarged cervical chain lymph nodes are identified with no abnormal uptake noted in the  neck.    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     GERD (gastroesophageal reflux disease)     Hypercholesterolemia     Hypertension     Lymphadenopathy     Thickened endometrium      Family History   Problem Relation Name Age of Onset    Lung cancer Maternal Grandfather      Diabetes Father      Diabetes Mother      Diabetes Brother      Lung cancer Sister      Diabetes Sister      No Known Problems Maternal Grandmother      No Known Problems Paternal Grandmother      No Known Problems Paternal Grandfather       Social History     Socioeconomic History    Marital status: Single   Tobacco Use    Smoking status: Never    Smokeless tobacco: Never   Substance and Sexual Activity    Alcohol use: Yes    Drug use: Not Currently    Sexual activity: Not Currently     Social Determinants of Health     Financial Resource Strain: Low Risk  (12/6/2023)    Overall Financial Resource Strain (CARDIA)     Difficulty of Paying Living Expenses: Not hard at all   Food Insecurity: No Food Insecurity (12/6/2023)    Hunger Vital Sign     Worried About Running Out of Food in the Last Year: Never true     Ran Out of Food in the Last Year: Never true   Transportation Needs: No Transportation Needs (12/6/2023)    PRAPARE - Transportation     Lack of Transportation (Medical): No     Lack of Transportation (Non-Medical): No   Physical Activity: Unknown (5/29/2020)    Exercise Vital Sign     Days of Exercise per Week: Patient declined     Minutes of Exercise per Session: Patient declined   Stress: No Stress Concern Present (12/6/2023)    [employer] [place] [employer] - Occupational Stress Questionnaire     Feeling of Stress : Not at all   Housing Stability: Low  Risk  (12/6/2023)    Housing Stability Vital Sign     Unable to Pay for Housing in the Last Year: No     Number of Places Lived in the Last Year: 1     Unstable Housing in the Last Year: No     Past Surgical History:   Procedure Laterality Date    BONE BIOPSY  12/2021    LYMPH NODE REMOVAL      lymph nodes removal      tendonitis           Review of systems:  Review of Systems   Constitutional:  Negative for activity change, appetite change, chills, diaphoresis, fatigue, fever and unexpected weight change.   HENT:  Negative for congestion, dental problem, mouth sores, nosebleeds, sore throat, tinnitus and voice change.    Eyes:  Negative for photophobia, discharge and visual disturbance.   Respiratory:  Negative for apnea, cough, choking, chest tightness, shortness of breath, wheezing and stridor.    Cardiovascular:  Negative for chest pain, palpitations and leg swelling.   Gastrointestinal:  Negative for abdominal distention, abdominal pain, anal bleeding, blood in stool, constipation, diarrhea, nausea, rectal pain and vomiting.   Endocrine: Negative for cold intolerance and heat intolerance.   Genitourinary:  Negative for difficulty urinating, dysuria, hematuria, menstrual problem, vaginal bleeding, vaginal discharge and vaginal pain.   Musculoskeletal:  Negative for arthralgias, back pain, gait problem, joint swelling and myalgias.   Skin:  Negative for color change, pallor, rash and wound.   Neurological:  Negative for dizziness, syncope, light-headedness and numbness.   Hematological:  Negative for adenopathy. Does not bruise/bleed easily.   Psychiatric/Behavioral:  Negative for agitation, confusion, sleep disturbance and suicidal ideas. The patient is not nervous/anxious.        Review of Systems   Constitutional: Negative for activity change, appetite change, chills, diaphoresis, fatigue and unexpected weight change.   HENT: Negative for congestion, facial swelling, hearing loss, mouth sores, trouble  swallowing and voice change.  Soft tissue swelling on the face. See HPI  Eyes: Negative for photophobia, pain, discharge and itching.   Respiratory: Negative for apnea, cough, choking, chest tightness and shortness of breath.    Cardiovascular: Negative for chest pain, palpitations and leg swelling.   Gastrointestinal: Negative for abdominal distention, abdominal pain, anal bleeding and blood in stool.   Endocrine: Negative for cold intolerance, heat intolerance, polydipsia and polyphagia.   Genitourinary: Negative for difficulty urinating, dysuria, flank pain and hematuria.   Musculoskeletal: Negative for arthralgias, back pain, joint swelling, myalgias, neck pain and neck stiffness.   Skin: Negative for color change, pallor and wound.   Allergic/Immunologic: Negative for environmental allergies, food allergies and immunocompromised state.   Neurological: Negative for dizziness, seizures, facial asymmetry, speech difficulty, light-headedness, numbness and headaches.   Hematological: Negative for adenopathy. Does not bruise/bleed easily.   Psychiatric/Behavioral: Negative for agitation, behavioral problems, confusion, decreased concentration and sleep disturbance.       Physical Exam  Constitutional:       Appearance: She is well-developed.   HENT:      Head: Normocephalic.   Eyes:      Conjunctiva/sclera: Conjunctivae normal.      Pupils: Pupils are equal, round, and reactive to light.   Cardiovascular:      Rate and Rhythm: Normal rate and regular rhythm.      Heart sounds: Normal heart sounds.   Pulmonary:      Effort: Pulmonary effort is normal.      Breath sounds: Normal breath sounds.   Chest:      Chest wall: No mass, deformity or tenderness.   Breasts:     Breasts are symmetrical.   Abdominal:      General: Bowel sounds are normal.      Palpations: Abdomen is soft.   Musculoskeletal:         General: Normal range of motion.      Cervical back: Normal range of motion and neck supple.   Skin:     General: Skin  is warm and dry.   Neurological:      Mental Status: She is alert and oriented to person, place, and time.   Psychiatric:         Behavior: Behavior normal.         Thought Content: Thought content normal.         Judgment: Judgment normal.    Vitals and nursing note reviewed.   Constitutional:       General: She is not in acute distress.     Appearance: Normal appearance. She is not ill-appearing.   HENT:      Head: Normocephalic and atraumatic.      Nose: No congestion or rhinorrhea.  Very mild soft tissue swelling status post excisional biopsy site.  No lymphadenopathy  Eyes:      General: No scleral icterus.     Extraocular Movements: Extraocular movements intact.      Pupils: Pupils are equal, round, and reactive to light.   Cardiovascular:      Rate and Rhythm: Normal rate and regular rhythm.      Pulses: Normal pulses.      Heart sounds: Normal heart sounds. No murmur heard.  No gallop.    Pulmonary:      Effort: Pulmonary effort is normal. No respiratory distress.      Breath sounds: Normal breath sounds. No stridor. No wheezing or rhonchi.   Abdominal:      General: Bowel sounds are normal. There is no distension.      Palpations: There is no mass.      Tenderness: There is no abdominal tenderness. There is no guarding.   Musculoskeletal:         General: No swelling, tenderness, deformity or signs of injury. Normal range of motion.      Cervical back: Normal range of motion and neck supple. No rigidity. No muscular tenderness.      Right lower leg: No edema.      Left lower leg: No edema.   Skin:     General: Skin is warm.      Coloration: Skin is not jaundiced or pale.      Findings: No bruising or lesion.   Neurological:      General: No focal deficit present.      Mental Status: She is alert and oriented to person, place, and time.      Cranial Nerves: No cranial nerve deficit.      Sensory: No sensory deficit.      Motor: No weakness.      Gait: Gait normal.   Psychiatric:         Mood and Affect: Mood  normal.         Behavior: Behavior normal.         Thought Content: Thought content normal.     Vitals:    06/11/24 1037   BP: 128/81   Pulse: 80   Resp: 20   Body surface area is 2.15 meters squared.    Assessment/Plan:      ECOG 1    1. Suspicion for Diffuse Large B cell Lymphoma:    == No B symptoms.  Normal LDH.  CBC with differential revealed normochromic normocytic anemia with normal white blood cell count and platelets.  == Bone marrow aspiration biopsy revealed no evidence of B-cell lymphoma.  Normal cytogenetics.  == Excision all lymph node biopsy from the neck revealed lymph node hyperplasia:  Follicular and interfollicular areas with focal findings suggestive of progressive transformation of germinal center.  == Will discussed in tumor board as pathologist feels there is a high suspicion for B-cell lymphoma.  == PET-CT scan done 12/13/2021 shows the submental/sublingual mass to have markedly decreased now from initial 2.6 cm to 14 mm, also the uptake in this region is at or below blood pool which all points towards a reactive process.  The spleen is also normal in size but does demonstrate an FDG uptake with an SUV max of 7.8.  This is again indeterminate and not conclusive of lymphomatous involvement.  == I do not feel this is convincing evidence to diagnose this patient as a diffuse large B-cell lymphoma, rather it could be a reactive proliferation of lymph nodes which have diminished in size since initial presentation.  [facility] do not feel that an induction chemotherapy is indicated.  However will discuss in tumor board for consensus.    === 12/17/21:  Tumor board discussion in the interim and the consensus reached as above that this is likely benign proliferation of lymph nodes secondary to viral infection.  PET scan was also reviewed as above and did not show enlargement.  The FDG uptake in the spleen is likely physiologically reactive and could be secondary to a viral infection which previously  caused enlargement of the lymph node as noted on the pathology review.  Since no diagnosis of lymphoma would hence continue with observation only.  Will repeat imaging in case patient develops swelling or enlargement or any symptoms.  -- 12/15/22: doing well, no c/o , labs reviewed and will continue observation  -- no acute changes, labs reviewed, PE negative, denies any fever night sweats or weight loss.   ==06/11/2024: Doing well, denies fever, night sweats, chills, recurrent infections, unintentional wt loss or adenopathy. Physical exam completed, pt without adenopathy, wbc 8.7. Anemia improved hgb 11.6,2. Left TKR scheduled for 12/27/23 with Dr Almazan     3. Anemia   ==Improved    RTC 6 months cbc cmp iron studies, tsh ldh      Total time spent in counseling and discussion about further management options including relevant lab work, treatment,  prognosis, medications and intended side effects was more than 30 minutes. More than 50% of the time was spent on counseling and coordination of care.  I spent a total of 30 minutes on the day of the visit.This includes face to face time and non-face to face time preparing to see the patient (eg, review of tests), Obtaining and/or reviewing separately obtained history, Documenting clinical information in the electronic or other health record, Independently interpreting resultsand communicating results to the patient/family/caregiver, or Care coordination.

## 2024-12-03 LAB
% SATURATION: 15 % (ref 20–50)
BASOPHILS NFR BLD: 0.7 % (ref 0–3)
EOSINOPHIL NFR BLD: 4.3 % (ref 1–3)
ERYTHROCYTE [DISTWIDTH] IN BLOOD BY AUTOMATED COUNT: 13.2 % (ref 12.5–18)
FERRITIN SERPL-MCNC: 70 NG/ML (ref 8–388)
HCT VFR BLD AUTO: 36.3 % (ref 37–47)
HGB BLD-MCNC: 11.7 G/DL (ref 12–16)
IRON: 47 UG/DL (ref 26–170)
LDH SERPL L TO P-CCNC: 173 U/L (ref 82–234)
LYMPHOCYTES NFR BLD: 16.4 % (ref 25–40)
MCH RBC QN AUTO: 28.4 PG (ref 27–31.2)
MCHC RBC AUTO-ENTMCNC: 32.2 G/DL (ref 31.8–35.4)
MCV RBC AUTO: 88.1 FL (ref 80–97)
MONOCYTES NFR BLD: 7.1 % (ref 1–15)
NEUTROPHILS # BLD AUTO: 7.4 10*3/UL (ref 1.8–7.7)
NEUTROPHILS NFR BLD: 71.1 % (ref 37–80)
NUCLEATED RED BLOOD CELLS: 0 %
PLATELETS: 299 10*3/UL (ref 142–424)
RBC # BLD AUTO: 4.12 10*6/UL (ref 4.2–5.4)
TOTAL IRON BINDING CAPACITY: 320 UG/DL (ref 250–450)
TSH SERPL DL<=0.005 MIU/L-ACNC: 1.8 UIU/ML (ref 0.36–3.74)
VITAMIN B12: 640 PG/ML (ref 193–986)
WBC # BLD: 10.4 10*3/UL (ref 4.6–10.2)

## 2024-12-04 ENCOUNTER — OFFICE VISIT (OUTPATIENT)
Dept: HEMATOLOGY/ONCOLOGY | Facility: CLINIC | Age: 59
End: 2024-12-04
Payer: COMMERCIAL

## 2024-12-04 VITALS
WEIGHT: 230.81 LBS | OXYGEN SATURATION: 96 % | HEIGHT: 61 IN | RESPIRATION RATE: 16 BRPM | SYSTOLIC BLOOD PRESSURE: 122 MMHG | BODY MASS INDEX: 43.58 KG/M2 | DIASTOLIC BLOOD PRESSURE: 75 MMHG | HEART RATE: 84 BPM

## 2024-12-04 DIAGNOSIS — R59.1 LYMPHADENOPATHY: Primary | ICD-10-CM

## 2024-12-04 DIAGNOSIS — C83.31 DIFFUSE LARGE B-CELL LYMPHOMA OF LYMPH NODES OF NECK: ICD-10-CM

## 2024-12-04 DIAGNOSIS — D64.9 ANEMIA, UNSPECIFIED TYPE: ICD-10-CM

## 2024-12-04 PROCEDURE — 3074F SYST BP LT 130 MM HG: CPT | Mod: CPTII,,, | Performed by: NURSE PRACTITIONER

## 2024-12-04 PROCEDURE — 4010F ACE/ARB THERAPY RXD/TAKEN: CPT | Mod: CPTII,,, | Performed by: NURSE PRACTITIONER

## 2024-12-04 PROCEDURE — 3008F BODY MASS INDEX DOCD: CPT | Mod: CPTII,,, | Performed by: NURSE PRACTITIONER

## 2024-12-04 PROCEDURE — 1159F MED LIST DOCD IN RCRD: CPT | Mod: CPTII,,, | Performed by: NURSE PRACTITIONER

## 2024-12-04 PROCEDURE — 3078F DIAST BP <80 MM HG: CPT | Mod: CPTII,,, | Performed by: NURSE PRACTITIONER

## 2024-12-04 PROCEDURE — 99214 OFFICE O/P EST MOD 30 MIN: CPT | Mod: S$PBB,,, | Performed by: NURSE PRACTITIONER

## 2024-12-04 NOTE — PROGRESS NOTES
MEDICAL ONCOLOGY FOLLOW UP CONSULTATION NOTE    Patient ID: Sheron Sim is a 59 y.o. female.    Chief Complaint: Suspicion for Diffuse Large B cell lymphoma    HPI: is 56-year-old black female referred to our clinic by Dr. Núñez, ENT. Patient states approximately 3 months ago she noticed a mass underneath her chin.  She reported this to her PCP Dr. Gracia who referred her for evaluation with ENT.  Subsequently patient underwent CT neck,  Unavailable for review at this time, and FNA.  Patient states FNA was inconclusive so Dr. Núñez proceeded with excisional biopsy of lymph node on 11/12/21.  Pathology is listed below which is suspicious for B cell lymphoma.  Patient denies any fevers night sweats or weight loss and no palpable lymph nodes noted today on physical exam.     Pathology: 11/16/21    Lymph node, neck, excisional biopsy:  Reactive lymph node with follicular and interfollicular hyperplasia and focal findings suggestive of progressive transformation of germinal center    Immunostain results:  BCL2:  Negative in germinal center region of follicles.  BCL 6:  Positive in B cells in germinal center origin of follicles.  CD10:  Positive in germinal center region of follicles    CD3:  Highlights background largest interfollicular T cell infiltrate.  Ki 67:  Proliferative index is 80% and germinal center regions, compatible with reactive.    BONE MARROW, SITE NOT SPECIFIED, CORE BIOPSY, CLOT SECTION, ASPIRATE, AND   PERIPHERAL SMEAR: 12/7/21  -- NEGATIVE FOR INVOLVEMENT BY LYMPHOMA.   -- NORMOCELLULAR BONE MARROW (APPROXIMATELY 50% CELLULARITY) WITH:   -- NO ATYPICAL INFILTRATE OF LYMPHOID CELLS, AS EVALUATED BY   IMMUNOHISTOCHEMISTRY.   -- GRANULOCYTIC SERIES PRESENT AND MATURING WITHOUT ATYPIA.   -- ERYTHROID SERIES PRESENT AND MATURING WITHOUT ATYPIA.   -- M:E RATIO APPROXIMATELY 2:1.   -- MEGAKARYOCYTES NORMAL IN NUMBER, SCATTERED, WITHOUT ATYPIA.   -- NO GRANULOMATOUS INFLAMMATION OR METASTATIC  CARCINOMA SEEN.   -- IRON STAINING PRESENT.   -- FLOW CYTOMETRY REVEALS NO UNIQUE CELL POPULATIONS, SEE REPORT BELOW.   - FISH STUDIES ARE NORMAL, SEE REPORT BELOW.   - SEE COMMENT.   Comment:   Clinically, the patient has a high suspicion for underlying a B-cell   lymphoma process involving the neck.  The bone marrow evaluation is an   essentially normal evaluation without evidence of lymphoma.  CCND1,   CCND1/IGH, BCL2, and BCL6 studies ( a requested by ordering clinician) are   negative for rearrangement by FISH analyses. Correlate clinically for   significance.     CYTOGENETICS REPORT:   KARYOTYPE:                   -46,XX[20]                   -NORMAL FEMALE KARYOTYPE   FLUORESCENCE IN-SITU HYBRIDIZATION (FISH) STUDIES:                  -FISH ANALYSIS FOR CCND1 (BCL1) Rearrangement - NEGATIVE      -nuc mimi(NFVX3m3)[200]                  -FISH ANALYSIS FOR CCND1/IGH REARRANGEMENT - NEGATIVE                  -nuc mimi(CCND1,IGH)x2[200]      -FISH Analysis for BCL2 Rearrangement - NEGATIVE      -nuc mimi(BCL2x2)[200]                   -FISH Analysis for BCL6 Rearrangement - NEGATIVE      -nuc mimi(BCL6x2)[200]   INTERPRETATION: Cytogenetic analysis of the bone marrow aspirate reveals a   NORMAL female karyotype. At the level of resolution obtained in this study,   all of the chromosomes had normal G-banded patterns with no evidence of any   consistent chromosomal aberration to suggest an acquired clonal abnormality.    CCND1, CCND1/IGH, BCL2, and BCL6 are negative for rearrangement by FISH    Imaging:     PET scan: 12/13/21    The previously noted submental, sublingual mass is markedly decreased in size now measuring no more than 14 mm in short axis.  This previously measured up to 2.6 cm.  An adjacent 5 mm lesion appears similar in size compared to prior study.  The uptake in this region is at or below blood pool.  No enlarged cervical chain lymph nodes are identified with no abnormal uptake noted in the  neck.    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     GERD (gastroesophageal reflux disease)     Hypercholesterolemia     Hypertension     Lymphadenopathy     Thickened endometrium      Family History   Problem Relation Name Age of Onset    Lung cancer Maternal Grandfather      Diabetes Father      Diabetes Mother      Diabetes Brother      Lung cancer Sister      Diabetes Sister      No Known Problems Maternal Grandmother      No Known Problems Paternal Grandmother      No Known Problems Paternal Grandfather       Social History     Socioeconomic History    Marital status: Single   Tobacco Use    Smoking status: Never    Smokeless tobacco: Never   Substance and Sexual Activity    Alcohol use: Yes    Drug use: Not Currently    Sexual activity: Not Currently     Social Drivers of Health     Financial Resource Strain: Low Risk  (12/6/2023)    Overall Financial Resource Strain (CARDIA)     Difficulty of Paying Living Expenses: Not hard at all   Food Insecurity: No Food Insecurity (12/6/2023)    Hunger Vital Sign     Worried About Running Out of Food in the Last Year: Never true     Ran Out of Food in the Last Year: Never true   Transportation Needs: No Transportation Needs (12/6/2023)    PRAPARE - Transportation     Lack of Transportation (Medical): No     Lack of Transportation (Non-Medical): No   Physical Activity: Unknown (5/29/2020)    Exercise Vital Sign     Days of Exercise per Week: Patient declined     Minutes of Exercise per Session: Patient declined   Stress: No Stress Concern Present (12/6/2023)    [employer] - Occupational Stress Questionnaire     Feeling of Stress : Not at all   Housing Stability: Low Risk   (12/6/2023)    Housing Stability Vital Sign     Unable to Pay for Housing in the Last Year: No     Number of Places Lived in the Last Year: 1     Unstable Housing in the Last Year: No     Past Surgical History:   Procedure Laterality Date    BONE BIOPSY  12/2021    LYMPH NODE REMOVAL      lymph nodes removal      tendonitis           Review of systems:  Review of Systems   Constitutional:  Negative for activity change, appetite change, chills, diaphoresis, fatigue, fever and unexpected weight change.   HENT:  Negative for congestion, dental problem, mouth sores, nosebleeds, sore throat, tinnitus and voice change.    Eyes:  Negative for photophobia, discharge and visual disturbance.   Respiratory:  Negative for apnea, cough, choking, chest tightness, shortness of breath, wheezing and stridor.    Cardiovascular:  Negative for chest pain, palpitations and leg swelling.   Gastrointestinal:  Negative for abdominal distention, abdominal pain, anal bleeding, blood in stool, constipation, diarrhea, nausea, rectal pain and vomiting.   Endocrine: Negative for cold intolerance and heat intolerance.   Genitourinary:  Negative for difficulty urinating, dysuria, hematuria, menstrual problem, vaginal bleeding, vaginal discharge and vaginal pain.   Musculoskeletal:  Negative for arthralgias, back pain, gait problem, joint swelling and myalgias.   Skin:  Negative for color change, pallor, rash and wound.   Neurological:  Negative for dizziness, syncope, light-headedness and numbness.   Hematological:  Negative for adenopathy. Does not bruise/bleed easily.   Psychiatric/Behavioral:  Negative for agitation, confusion, sleep disturbance and suicidal ideas. The patient is not nervous/anxious.        Review of Systems   Constitutional: Negative for activity change, appetite change, chills, diaphoresis, fatigue and unexpected weight change.   HENT: Negative for congestion, facial swelling, hearing loss, mouth sores, trouble swallowing and  voice change.  Soft tissue swelling on the face. See HPI  Eyes: Negative for photophobia, pain, discharge and itching.   Respiratory: Negative for apnea, cough, choking, chest tightness and shortness of breath.    Cardiovascular: Negative for chest pain, palpitations and leg swelling.   Gastrointestinal: Negative for abdominal distention, abdominal pain, anal bleeding and blood in stool.   Endocrine: Negative for cold intolerance, heat intolerance, polydipsia and polyphagia.   Genitourinary: Negative for difficulty urinating, dysuria, flank pain and hematuria.   Musculoskeletal: Negative for arthralgias, back pain, joint swelling, myalgias, neck pain and neck stiffness.   Skin: Negative for color change, pallor and wound.   Allergic/Immunologic: Negative for environmental allergies, food allergies and immunocompromised state.   Neurological: Negative for dizziness, seizures, facial asymmetry, speech difficulty, light-headedness, numbness and headaches.   Hematological: Negative for adenopathy. Does not bruise/bleed easily.   Psychiatric/Behavioral: Negative for agitation, behavioral problems, confusion, decreased concentration and sleep disturbance.       Physical Exam  Constitutional:       Appearance: She is well-developed.   HENT:      Head: Normocephalic.   Eyes:      Conjunctiva/sclera: Conjunctivae normal.      Pupils: Pupils are equal, round, and reactive to light.   Cardiovascular:      Rate and Rhythm: Normal rate and regular rhythm.      Heart sounds: Normal heart sounds.   Pulmonary:      Effort: Pulmonary effort is normal.      Breath sounds: Normal breath sounds.   Chest:      Chest wall: No mass, deformity or tenderness.   Breasts:     Breasts are symmetrical.   Abdominal:      General: Bowel sounds are normal.      Palpations: Abdomen is soft.   Musculoskeletal:         General: Normal range of motion.      Cervical back: Normal range of motion and neck supple.   Skin:     General: Skin is warm and  dry.   Neurological:      Mental Status: She is alert and oriented to person, place, and time.   Psychiatric:         Behavior: Behavior normal.         Thought Content: Thought content normal.         Judgment: Judgment normal.    Vitals and nursing note reviewed.   Constitutional:       General: She is not in acute distress.     Appearance: Normal appearance. She is not ill-appearing.   HENT:      Head: Normocephalic and atraumatic.      Nose: No congestion or rhinorrhea.  Very mild soft tissue swelling status post excisional biopsy site.  No lymphadenopathy  Eyes:      General: No scleral icterus.     Extraocular Movements: Extraocular movements intact.      Pupils: Pupils are equal, round, and reactive to light.   Cardiovascular:      Rate and Rhythm: Normal rate and regular rhythm.      Pulses: Normal pulses.      Heart sounds: Normal heart sounds. No murmur heard.  No gallop.    Pulmonary:      Effort: Pulmonary effort is normal. No respiratory distress.      Breath sounds: Normal breath sounds. No stridor. No wheezing or rhonchi.   Abdominal:      General: Bowel sounds are normal. There is no distension.      Palpations: There is no mass.      Tenderness: There is no abdominal tenderness. There is no guarding.   Musculoskeletal:         General: No swelling, tenderness, deformity or signs of injury. Normal range of motion.      Cervical back: Normal range of motion and neck supple. No rigidity. No muscular tenderness.      Right lower leg: No edema.      Left lower leg: No edema.   Skin:     General: Skin is warm.      Coloration: Skin is not jaundiced or pale.      Findings: No bruising or lesion.   Neurological:      General: No focal deficit present.      Mental Status: She is alert and oriented to person, place, and time.      Cranial Nerves: No cranial nerve deficit.      Sensory: No sensory deficit.      Motor: No weakness.      Gait: Gait normal.   Psychiatric:         Mood and Affect: Mood normal.          Behavior: Behavior normal.         Thought Content: Thought content normal.     There were no vitals filed for this visit.  There is no height or weight on file to calculate BSA.    Assessment/Plan:      ECOG 1    1. Suspicion for Diffuse Large B cell Lymphoma:    == No B symptoms.  Normal LDH.  CBC with differential revealed normochromic normocytic anemia with normal white blood cell count and platelets.  == Bone marrow aspiration biopsy revealed no evidence of B-cell lymphoma.  Normal cytogenetics.  == Excision all lymph node biopsy from the neck revealed lymph node hyperplasia:  Follicular and interfollicular areas with focal findings suggestive of progressive transformation of germinal center.  == Will discussed in tumor board as pathologist feels there is a high suspicion for B-cell lymphoma.  == PET-CT scan done 12/13/2021 shows the submental/sublingual mass to have markedly decreased now from initial 2.6 cm to 14 mm, also the uptake in this region is at or below blood pool which all points towards a reactive process.  The spleen is also normal in size but does demonstrate an FDG uptake with an SUV max of 7.8.  This is again indeterminate and not conclusive of lymphomatous involvement.  == I do not feel this is convincing evidence to diagnose this patient as a diffuse large B-cell lymphoma, rather it could be a reactive proliferation of lymph nodes which have diminished in size since initial presentation.  I [facility] do not feel that an induction chemotherapy is indicated.  However will discuss in tumor board for consensus.    === 12/17/21:  Tumor board discussion in the interim and the consensus reached as above that this is likely benign proliferation of lymph nodes secondary to viral infection.  PET scan was also reviewed as above and did not show enlargement.  The FDG uptake in the spleen is likely physiologically reactive and could be secondary to a viral infection which previously caused enlargement of  the lymph node as noted on the pathology review.  Since no diagnosis of lymphoma would hence continue with observation only.  Will repeat imaging in case patient develops swelling or enlargement or any symptoms.  -- 12/15/22: doing well, no c/o , labs reviewed and will continue observation  -- no acute changes, labs reviewed, PE negative, denies any fever night sweats or weight loss.   ==06/11/2024: Doing well, denies fever, night sweats, chills, recurrent infections, unintentional wt loss or adenopathy. Physical exam completed, pt without adenopathy, wbc 8.7. Anemia improved hgb 11.6,2. Left TKR scheduled for 12/27/23 with Dr Almazan   ==12/04/2024: doing well, mild anemia, asymptomatic. Denies fever, night sweats or unintentional weight loss. Labs reviewed will continue observation    3. Anemia   ==Improved    RTC 6 months cbc cmp iron studies, tsh ldh      Total time spent in counseling and discussion about further management options including relevant lab work, treatment,  prognosis, medications and intended side effects was more than 30 minutes. More than 50% of the time was spent on counseling and coordination of care.  I spent a total of 30 minutes on the day of the visit.This includes face to face time and non-face to face time preparing to see the patient (eg, review of tests), Obtaining and/or reviewing separately obtained history, Documenting clinical information in the electronic or other health record, Independently interpreting resultsand communicating results to the patient/family/caregiver, or Care coordination.

## 2025-06-04 ENCOUNTER — OFFICE VISIT (OUTPATIENT)
Dept: HEMATOLOGY/ONCOLOGY | Facility: CLINIC | Age: 60
End: 2025-06-04
Payer: COMMERCIAL

## 2025-06-04 VITALS
RESPIRATION RATE: 16 BRPM | DIASTOLIC BLOOD PRESSURE: 68 MMHG | BODY MASS INDEX: 44.12 KG/M2 | SYSTOLIC BLOOD PRESSURE: 129 MMHG | HEIGHT: 61 IN | WEIGHT: 233.69 LBS | OXYGEN SATURATION: 96 % | HEART RATE: 81 BPM

## 2025-06-04 DIAGNOSIS — C83.31 DIFFUSE LARGE B-CELL LYMPHOMA OF LYMPH NODES OF NECK: ICD-10-CM

## 2025-06-04 DIAGNOSIS — R59.1 LYMPHADENOPATHY: Primary | ICD-10-CM

## 2025-06-04 PROCEDURE — 3078F DIAST BP <80 MM HG: CPT | Mod: CPTII,,, | Performed by: NURSE PRACTITIONER

## 2025-06-04 PROCEDURE — 1159F MED LIST DOCD IN RCRD: CPT | Mod: CPTII,,, | Performed by: NURSE PRACTITIONER

## 2025-06-04 PROCEDURE — 99214 OFFICE O/P EST MOD 30 MIN: CPT | Mod: S$PBB,,, | Performed by: NURSE PRACTITIONER

## 2025-06-04 PROCEDURE — 4010F ACE/ARB THERAPY RXD/TAKEN: CPT | Mod: CPTII,,, | Performed by: NURSE PRACTITIONER

## 2025-06-04 PROCEDURE — 3074F SYST BP LT 130 MM HG: CPT | Mod: CPTII,,, | Performed by: NURSE PRACTITIONER

## 2025-06-04 PROCEDURE — 3008F BODY MASS INDEX DOCD: CPT | Mod: CPTII,,, | Performed by: NURSE PRACTITIONER
